# Patient Record
Sex: MALE | Race: BLACK OR AFRICAN AMERICAN | NOT HISPANIC OR LATINO | Employment: FULL TIME | URBAN - METROPOLITAN AREA
[De-identification: names, ages, dates, MRNs, and addresses within clinical notes are randomized per-mention and may not be internally consistent; named-entity substitution may affect disease eponyms.]

---

## 2022-03-24 ENCOUNTER — HOSPITAL ENCOUNTER (INPATIENT)
Facility: HOSPITAL | Age: 62
LOS: 3 days | Discharge: HOME | DRG: 280 | End: 2022-03-27
Attending: EMERGENCY MEDICINE | Admitting: INTERNAL MEDICINE
Payer: COMMERCIAL

## 2022-03-24 DIAGNOSIS — I21.4 NSTEMI (NON-ST ELEVATED MYOCARDIAL INFARCTION): ICD-10-CM

## 2022-03-24 DIAGNOSIS — R79.89 ELEVATED TROPONIN: ICD-10-CM

## 2022-03-24 DIAGNOSIS — I50.9 ACUTE CONGESTIVE HEART FAILURE, UNSPECIFIED HEART FAILURE TYPE: ICD-10-CM

## 2022-03-24 DIAGNOSIS — R06.02 SHORTNESS OF BREATH: ICD-10-CM

## 2022-03-24 DIAGNOSIS — I50.9 CHF (CONGESTIVE HEART FAILURE): ICD-10-CM

## 2022-03-24 DIAGNOSIS — I16.1 HYPERTENSIVE EMERGENCY: Primary | ICD-10-CM

## 2022-03-24 PROBLEM — I16.0 HYPERTENSIVE URGENCY: Status: ACTIVE | Noted: 2022-03-24

## 2022-03-24 LAB
ALBUMIN SERPL BCP-MCNC: 3.9 G/DL (ref 3.5–5.2)
ALP SERPL-CCNC: 107 U/L (ref 55–135)
ALT SERPL W/O P-5'-P-CCNC: 48 U/L (ref 10–44)
ANION GAP SERPL CALC-SCNC: 16 MMOL/L (ref 8–16)
AST SERPL-CCNC: 65 U/L (ref 10–40)
BASOPHILS # BLD AUTO: 0.04 K/UL (ref 0–0.2)
BASOPHILS NFR BLD: 0.9 % (ref 0–1.9)
BILIRUB SERPL-MCNC: 0.4 MG/DL (ref 0.1–1)
BNP SERPL-MCNC: 2884 PG/ML (ref 0–99)
BUN SERPL-MCNC: 19 MG/DL (ref 8–23)
CALCIUM SERPL-MCNC: 9.7 MG/DL (ref 8.7–10.5)
CHLORIDE SERPL-SCNC: 102 MMOL/L (ref 95–110)
CO2 SERPL-SCNC: 25 MMOL/L (ref 23–29)
CREAT SERPL-MCNC: 1.4 MG/DL (ref 0.5–1.4)
DIFFERENTIAL METHOD: ABNORMAL
EOSINOPHIL # BLD AUTO: 0.2 K/UL (ref 0–0.5)
EOSINOPHIL NFR BLD: 4 % (ref 0–8)
ERYTHROCYTE [DISTWIDTH] IN BLOOD BY AUTOMATED COUNT: 15.8 % (ref 11.5–14.5)
EST. GFR  (AFRICAN AMERICAN): >60 ML/MIN/1.73 M^2
EST. GFR  (NON AFRICAN AMERICAN): 54 ML/MIN/1.73 M^2
GLUCOSE SERPL-MCNC: 79 MG/DL (ref 70–110)
HCT VFR BLD AUTO: 50.8 % (ref 40–54)
HCV AB SERPL QL IA: NEGATIVE
HEP C VIRUS HOLD SPECIMEN: NORMAL
HGB BLD-MCNC: 16.7 G/DL (ref 14–18)
HIV 1+2 AB+HIV1 P24 AG SERPL QL IA: NEGATIVE
IMM GRANULOCYTES # BLD AUTO: 0.01 K/UL (ref 0–0.04)
IMM GRANULOCYTES NFR BLD AUTO: 0.2 % (ref 0–0.5)
LYMPHOCYTES # BLD AUTO: 1.2 K/UL (ref 1–4.8)
LYMPHOCYTES NFR BLD: 26.7 % (ref 18–48)
MCH RBC QN AUTO: 29.5 PG (ref 27–31)
MCHC RBC AUTO-ENTMCNC: 32.9 G/DL (ref 32–36)
MCV RBC AUTO: 90 FL (ref 82–98)
MONOCYTES # BLD AUTO: 0.2 K/UL (ref 0.3–1)
MONOCYTES NFR BLD: 5.3 % (ref 4–15)
NEUTROPHILS # BLD AUTO: 2.9 K/UL (ref 1.8–7.7)
NEUTROPHILS NFR BLD: 62.9 % (ref 38–73)
NRBC BLD-RTO: 0 /100 WBC
PLATELET # BLD AUTO: 158 K/UL (ref 150–450)
PMV BLD AUTO: 10.2 FL (ref 9.2–12.9)
POTASSIUM SERPL-SCNC: 4.7 MMOL/L (ref 3.5–5.1)
PROT SERPL-MCNC: 8 G/DL (ref 6–8.4)
RBC # BLD AUTO: 5.67 M/UL (ref 4.6–6.2)
SARS-COV-2 RDRP RESP QL NAA+PROBE: NEGATIVE
SODIUM SERPL-SCNC: 143 MMOL/L (ref 136–145)
TROPONIN I SERPL DL<=0.01 NG/ML-MCNC: 0.14 NG/ML (ref 0–0.03)
TROPONIN I SERPL DL<=0.01 NG/ML-MCNC: 0.14 NG/ML (ref 0–0.03)
WBC # BLD AUTO: 4.54 K/UL (ref 3.9–12.7)

## 2022-03-24 PROCEDURE — 20000000 HC ICU ROOM

## 2022-03-24 PROCEDURE — 80061 LIPID PANEL: CPT | Performed by: NURSE PRACTITIONER

## 2022-03-24 PROCEDURE — 63600175 PHARM REV CODE 636 W HCPCS: Performed by: EMERGENCY MEDICINE

## 2022-03-24 PROCEDURE — 85025 COMPLETE CBC W/AUTO DIFF WBC: CPT | Performed by: REGISTERED NURSE

## 2022-03-24 PROCEDURE — 80053 COMPREHEN METABOLIC PANEL: CPT | Performed by: REGISTERED NURSE

## 2022-03-24 PROCEDURE — 99291 CRITICAL CARE FIRST HOUR: CPT | Mod: 25

## 2022-03-24 PROCEDURE — 96374 THER/PROPH/DIAG INJ IV PUSH: CPT

## 2022-03-24 PROCEDURE — 84484 ASSAY OF TROPONIN QUANT: CPT | Performed by: REGISTERED NURSE

## 2022-03-24 PROCEDURE — 87389 HIV-1 AG W/HIV-1&-2 AB AG IA: CPT | Performed by: EMERGENCY MEDICINE

## 2022-03-24 PROCEDURE — 25000003 PHARM REV CODE 250: Performed by: EMERGENCY MEDICINE

## 2022-03-24 PROCEDURE — 25500020 PHARM REV CODE 255: Performed by: EMERGENCY MEDICINE

## 2022-03-24 PROCEDURE — 36415 COLL VENOUS BLD VENIPUNCTURE: CPT | Performed by: NURSE PRACTITIONER

## 2022-03-24 PROCEDURE — 83880 ASSAY OF NATRIURETIC PEPTIDE: CPT | Performed by: REGISTERED NURSE

## 2022-03-24 PROCEDURE — 93005 ELECTROCARDIOGRAM TRACING: CPT

## 2022-03-24 PROCEDURE — 93010 EKG 12-LEAD: ICD-10-PCS | Mod: ,,, | Performed by: STUDENT IN AN ORGANIZED HEALTH CARE EDUCATION/TRAINING PROGRAM

## 2022-03-24 PROCEDURE — 84484 ASSAY OF TROPONIN QUANT: CPT | Mod: 91 | Performed by: NURSE PRACTITIONER

## 2022-03-24 PROCEDURE — 93010 ELECTROCARDIOGRAM REPORT: CPT | Mod: ,,, | Performed by: STUDENT IN AN ORGANIZED HEALTH CARE EDUCATION/TRAINING PROGRAM

## 2022-03-24 PROCEDURE — U0002 COVID-19 LAB TEST NON-CDC: HCPCS | Performed by: EMERGENCY MEDICINE

## 2022-03-24 PROCEDURE — 86803 HEPATITIS C AB TEST: CPT | Performed by: EMERGENCY MEDICINE

## 2022-03-24 RX ORDER — ACETAMINOPHEN 325 MG/1
650 TABLET ORAL EVERY 4 HOURS PRN
Status: DISCONTINUED | OUTPATIENT
Start: 2022-03-24 | End: 2022-03-27 | Stop reason: HOSPADM

## 2022-03-24 RX ORDER — AMOXICILLIN 250 MG
1 CAPSULE ORAL DAILY PRN
Status: DISCONTINUED | OUTPATIENT
Start: 2022-03-24 | End: 2022-03-27 | Stop reason: HOSPADM

## 2022-03-24 RX ORDER — METOPROLOL TARTRATE 25 MG/1
25 TABLET, FILM COATED ORAL 2 TIMES DAILY
Status: DISCONTINUED | OUTPATIENT
Start: 2022-03-25 | End: 2022-03-27

## 2022-03-24 RX ORDER — AMLODIPINE BESYLATE 5 MG/1
10 TABLET ORAL
Status: COMPLETED | OUTPATIENT
Start: 2022-03-24 | End: 2022-03-24

## 2022-03-24 RX ORDER — SODIUM CHLORIDE 0.9 % (FLUSH) 0.9 %
10 SYRINGE (ML) INJECTION EVERY 12 HOURS PRN
Status: DISCONTINUED | OUTPATIENT
Start: 2022-03-24 | End: 2022-03-27 | Stop reason: HOSPADM

## 2022-03-24 RX ORDER — ATORVASTATIN CALCIUM 40 MG/1
40 TABLET, FILM COATED ORAL DAILY
Status: DISCONTINUED | OUTPATIENT
Start: 2022-03-25 | End: 2022-03-27 | Stop reason: HOSPADM

## 2022-03-24 RX ORDER — GLUCAGON 1 MG
1 KIT INJECTION
Status: DISCONTINUED | OUTPATIENT
Start: 2022-03-24 | End: 2022-03-27 | Stop reason: HOSPADM

## 2022-03-24 RX ORDER — SIMETHICONE 80 MG
1 TABLET,CHEWABLE ORAL 4 TIMES DAILY PRN
Status: DISCONTINUED | OUTPATIENT
Start: 2022-03-24 | End: 2022-03-27 | Stop reason: HOSPADM

## 2022-03-24 RX ORDER — TALC
6 POWDER (GRAM) TOPICAL NIGHTLY PRN
Status: DISCONTINUED | OUTPATIENT
Start: 2022-03-24 | End: 2022-03-27 | Stop reason: HOSPADM

## 2022-03-24 RX ORDER — FUROSEMIDE 10 MG/ML
40 INJECTION INTRAMUSCULAR; INTRAVENOUS
Status: DISCONTINUED | OUTPATIENT
Start: 2022-03-25 | End: 2022-03-25

## 2022-03-24 RX ORDER — MORPHINE SULFATE 4 MG/ML
6 INJECTION, SOLUTION INTRAMUSCULAR; INTRAVENOUS
Status: COMPLETED | OUTPATIENT
Start: 2022-03-24 | End: 2022-03-24

## 2022-03-24 RX ORDER — NALOXONE HCL 0.4 MG/ML
0.02 VIAL (ML) INJECTION
Status: DISCONTINUED | OUTPATIENT
Start: 2022-03-24 | End: 2022-03-27 | Stop reason: HOSPADM

## 2022-03-24 RX ORDER — NICARDIPINE HYDROCHLORIDE 0.2 MG/ML
0-15 INJECTION INTRAVENOUS CONTINUOUS
Status: DISCONTINUED | OUTPATIENT
Start: 2022-03-24 | End: 2022-03-25

## 2022-03-24 RX ORDER — IBUPROFEN 200 MG
24 TABLET ORAL
Status: DISCONTINUED | OUTPATIENT
Start: 2022-03-24 | End: 2022-03-27 | Stop reason: HOSPADM

## 2022-03-24 RX ORDER — FUROSEMIDE 40 MG/1
40 TABLET ORAL
Status: COMPLETED | OUTPATIENT
Start: 2022-03-24 | End: 2022-03-24

## 2022-03-24 RX ORDER — ASPIRIN 81 MG/1
81 TABLET ORAL DAILY
Status: DISCONTINUED | OUTPATIENT
Start: 2022-03-25 | End: 2022-03-27 | Stop reason: HOSPADM

## 2022-03-24 RX ORDER — ENOXAPARIN SODIUM 100 MG/ML
40 INJECTION SUBCUTANEOUS EVERY 24 HOURS
Status: DISCONTINUED | OUTPATIENT
Start: 2022-03-25 | End: 2022-03-27 | Stop reason: HOSPADM

## 2022-03-24 RX ORDER — ASPIRIN 325 MG
325 TABLET ORAL
Status: COMPLETED | OUTPATIENT
Start: 2022-03-24 | End: 2022-03-24

## 2022-03-24 RX ORDER — ONDANSETRON 2 MG/ML
4 INJECTION INTRAMUSCULAR; INTRAVENOUS EVERY 8 HOURS PRN
Status: DISCONTINUED | OUTPATIENT
Start: 2022-03-24 | End: 2022-03-27 | Stop reason: HOSPADM

## 2022-03-24 RX ORDER — IBUPROFEN 200 MG
16 TABLET ORAL
Status: DISCONTINUED | OUTPATIENT
Start: 2022-03-24 | End: 2022-03-27 | Stop reason: HOSPADM

## 2022-03-24 RX ADMIN — MORPHINE SULFATE 6 MG: 4 INJECTION INTRAVENOUS at 05:03

## 2022-03-24 RX ADMIN — IOHEXOL 100 ML: 350 INJECTION, SOLUTION INTRAVENOUS at 06:03

## 2022-03-24 RX ADMIN — NICARDIPINE HYDROCHLORIDE 5 MG/HR: 0.2 INJECTION, SOLUTION INTRAVENOUS at 08:03

## 2022-03-24 RX ADMIN — FUROSEMIDE 40 MG: 40 INJECTION, SOLUTION INTRAMUSCULAR; INTRAVENOUS at 11:03

## 2022-03-24 RX ADMIN — ASPIRIN 325 MG ORAL TABLET 325 MG: 325 PILL ORAL at 08:03

## 2022-03-24 RX ADMIN — AMLODIPINE BESYLATE 10 MG: 5 TABLET ORAL at 04:03

## 2022-03-24 RX ADMIN — FUROSEMIDE 40 MG: 40 TABLET ORAL at 08:03

## 2022-03-24 NOTE — FIRST PROVIDER EVALUATION
Medical screening exam completed.  I have conducted a focused provider triage encounter, findings are as follows:    Brief history of present illness:  61-year-old male presents emergency department with abdominal pain that is consistent with his previous diverticulitis attacks.  Patient reports the pain is so bad that he sometimes feels short of breath.  Sent from [facility] for further evaluation.    There were no vitals filed for this visit.    Pertinent physical exam:  No acute distress    Brief workup plan:  Labs and further evaluation    Preliminary workup initiated; this workup will be continued and followed by the physician or advanced practice provider that is assigned to the patient when roomed.

## 2022-03-24 NOTE — ED PROVIDER NOTES
SCRIBE #1 NOTE: I, Jose Guadalupe Priest, am scribing for, and in the presence of, Valente Goode MD. I have scribed the entire note.       History     Chief Complaint   Patient presents with    Abdominal Pain     Pt presented to ED with c/o abdominal pains for the past week pt has hx of diverticulitis, Pt also having SOB upon exertion for the past 3 days       Review of patient's allergies indicates:  No Known Allergies      History of Present Illness     HPI    3/24/2022, 4:43 PM  History obtained from the patient      History of Present Illness: Vipin Person Jr. is a 61 y.o. male patient with a PMHx of HTN who presents to the Emergency Department for evaluation of periumbilical abdominal pain which onset 1 week ago. Symptoms are constant and moderate in severity. Patient reports that he has been out of his HTN medication for a long, unspecified period of time. Patient reports that he was previously diagnosed with diverticulitis. Associated sxs include SOB on exertion. Patient denies any nausea, vomiting, chest pain, chills, diarrhea, constipation, and all other sxs at this time. No prior Tx reported. No further complaints or concerns at this time.       Arrival mode: Personal vehicle    PCP: No primary care provider on file.        Past Medical History:  No past medical history on file.    Past Surgical History:  No past surgical history on file.      Family History:  No family history on file.    Social History:  Social History     Tobacco Use    Smoking status: Not on file    Smokeless tobacco: Not on file   Substance and Sexual Activity    Alcohol use: Not on file    Drug use: Not on file    Sexual activity: Not on file        Review of Systems     Review of Systems   Constitutional: Negative for chills and fever.   HENT: Negative for sore throat.    Respiratory: Positive for shortness of breath.    Cardiovascular: Negative for chest pain.   Gastrointestinal: Positive for abdominal pain (Periumbilical). Negative  for constipation, diarrhea, nausea and vomiting.   Genitourinary: Negative for dysuria.   Musculoskeletal: Negative for back pain.   Skin: Negative for rash.   Neurological: Negative for weakness.   Hematological: Does not bruise/bleed easily.   All other systems reviewed and are negative.       Physical Exam     Initial Vitals [03/24/22 1611]   BP Pulse Resp Temp SpO2   (!) 180/130 105 20 98 °F (36.7 °C) 95 %      MAP       --          Physical Exam  Nursing Notes and Vital Signs Reviewed.  Constitutional: Patient is in no acute distress. Well-developed and well-nourished.  Head: Atraumatic. Normocephalic.  Eyes: PERRL. EOM intact. Conjunctivae are not pale. No scleral icterus.  ENT: Mucous membranes are moist. Oropharynx is clear and symmetric.    Neck: Supple. Full ROM. No lymphadenopathy.  Cardiovascular: Tachycardic. Regular rhythm. No murmurs, rubs, or gallops. Distal pulses are 2+ and symmetric.  Pulmonary/Chest: No respiratory distress. Clear to auscultation bilaterally. No wheezing or rales.  Abdominal: Soft and non-distended.  There is no tenderness.  No rebound, guarding, or rigidity. Good bowel sounds.  Genitourinary: No CVA tenderness  Musculoskeletal: Moves all extremities. No obvious deformities. No edema. No calf tenderness.  Skin: Warm and dry.  Neurological:  Alert, awake, and appropriate.  Normal speech.  No acute focal neurological deficits are appreciated.  Psychiatric: Normal affect. Good eye contact. Appropriate in content.     ED Course   Critical Care    Date/Time: 3/25/2022 5:35 AM  Performed by: Valente Goode MD  Authorized by: Gibson Packer MD   Direct patient critical care time: 8 minutes  Additional history critical care time: 7 minutes  Ordering / reviewing critical care time: 6 minutes  Documentation critical care time: 7 minutes  Consulting other physicians critical care time: 6 minutes  Consult with family critical care time: 5 minutes  Other critical care time: 4  "minutes  Total critical care time (exclusive of procedural time) : 43 minutes  Critical care time was exclusive of separately billable procedures and treating other patients and teaching time.  Critical care was necessary to treat or prevent imminent or life-threatening deterioration of the following conditions: cardiac failure.  Critical care was time spent personally by me on the following activities: blood draw for specimens, development of treatment plan with patient or surrogate, discussions with consultants, interpretation of cardiac output measurements, evaluation of patient's response to treatment, examination of patient, obtaining history from patient or surrogate, ordering and review of laboratory studies, ordering and review of radiographic studies, ordering and performing treatments and interventions, pulse oximetry, re-evaluation of patient's condition and review of old charts.        ED Vital Signs:  Vitals:    03/24/22 1611 03/24/22 1653 03/24/22 1741 03/24/22 1749   BP: (!) 180/130 (!) 175/133 (!) 171/122    Pulse: 105      Resp: 20   20   Temp: 98 °F (36.7 °C)      TempSrc: Oral      SpO2: 95%      Weight: 62.7 kg (138 lb 3.7 oz)      Height: 5' 4" (1.626 m)       03/24/22 2000 03/24/22 2004   BP: (!) 166/117    Pulse:  95   Resp:     Temp:     TempSrc:     SpO2:     Weight:     Height:         Abnormal Lab Results:  Labs Reviewed   CBC W/ AUTO DIFFERENTIAL - Abnormal; Notable for the following components:       Result Value    RDW 15.8 (*)     Mono # 0.2 (*)     All other components within normal limits    Narrative:     Release to patient->Immediate   COMPREHENSIVE METABOLIC PANEL - Abnormal; Notable for the following components:    AST 65 (*)     ALT 48 (*)     eGFR if non  54 (*)     All other components within normal limits    Narrative:     Release to patient->Immediate   B-TYPE NATRIURETIC PEPTIDE - Abnormal; Notable for the following components:    BNP 2,884 (*)     All other " components within normal limits    Narrative:     Release to patient->Immediate   TROPONIN I - Abnormal; Notable for the following components:    Troponin I 0.144 (*)     All other components within normal limits    Narrative:     Release to patient->Immediate   HIV 1 / 2 ANTIBODY    Narrative:     Release to patient->Immediate   HEPATITIS C ANTIBODY    Narrative:     Release to patient->Immediate   HEP C VIRUS HOLD SPECIMEN    Narrative:     Release to patient->Immediate   SARS-COV-2 RNA AMPLIFICATION, QUAL        All Lab Results:  Results for orders placed or performed during the hospital encounter of 03/24/22   CBC auto differential   Result Value Ref Range    WBC 4.54 3.90 - 12.70 K/uL    RBC 5.67 4.60 - 6.20 M/uL    Hemoglobin 16.7 14.0 - 18.0 g/dL    Hematocrit 50.8 40.0 - 54.0 %    MCV 90 82 - 98 fL    MCH 29.5 27.0 - 31.0 pg    MCHC 32.9 32.0 - 36.0 g/dL    RDW 15.8 (H) 11.5 - 14.5 %    Platelets 158 150 - 450 K/uL    MPV 10.2 9.2 - 12.9 fL    Immature Granulocytes 0.2 0.0 - 0.5 %    Gran # (ANC) 2.9 1.8 - 7.7 K/uL    Immature Grans (Abs) 0.01 0.00 - 0.04 K/uL    Lymph # 1.2 1.0 - 4.8 K/uL    Mono # 0.2 (L) 0.3 - 1.0 K/uL    Eos # 0.2 0.0 - 0.5 K/uL    Baso # 0.04 0.00 - 0.20 K/uL    nRBC 0 0 /100 WBC    Gran % 62.9 38.0 - 73.0 %    Lymph % 26.7 18.0 - 48.0 %    Mono % 5.3 4.0 - 15.0 %    Eosinophil % 4.0 0.0 - 8.0 %    Basophil % 0.9 0.0 - 1.9 %    Differential Method Automated    Comprehensive metabolic panel   Result Value Ref Range    Sodium 143 136 - 145 mmol/L    Potassium 4.7 3.5 - 5.1 mmol/L    Chloride 102 95 - 110 mmol/L    CO2 25 23 - 29 mmol/L    Glucose 79 70 - 110 mg/dL    BUN 19 8 - 23 mg/dL    Creatinine 1.4 0.5 - 1.4 mg/dL    Calcium 9.7 8.7 - 10.5 mg/dL    Total Protein 8.0 6.0 - 8.4 g/dL    Albumin 3.9 3.5 - 5.2 g/dL    Total Bilirubin 0.4 0.1 - 1.0 mg/dL    Alkaline Phosphatase 107 55 - 135 U/L    AST 65 (H) 10 - 40 U/L    ALT 48 (H) 10 - 44 U/L    Anion Gap 16 8 - 16 mmol/L    eGFR if  African American >60 >60 mL/min/1.73 m^2    eGFR if non African American 54 (A) >60 mL/min/1.73 m^2   Brain natriuretic peptide   Result Value Ref Range    BNP 2,884 (H) 0 - 99 pg/mL   Troponin I   Result Value Ref Range    Troponin I 0.144 (H) 0.000 - 0.026 ng/mL   HIV 1/2 Ag/Ab (4th Gen)   Result Value Ref Range    HIV 1/2 Ag/Ab Negative Negative   Hepatitis C Antibody   Result Value Ref Range    Hepatitis C Ab Negative Negative   HCV Virus Hold Specimen   Result Value Ref Range    HEP C Virus Hold Specimen Hold for HCV sendout          Imaging Results:  Imaging Results          CT Abdomen Pelvis With Contrast (Final result)  Result time 03/24/22 18:38:25    Final result by Carl Newby MD (03/24/22 18:38:25)                 Impression:      Trace bilateral pleural effusions and probable pulmonary edema.  Correlation for volume overload.    Few scattered subsolid opacities possibly related to edema but viral pneumonitis not excluded.    No evidence of diverticulitis or other acute abdominal abnormality.    All CT scans at this facility are performed  using dose modulation techniques as appropriate to performed exam including the following:  automated exposure control; adjustment of mA and/or kV according to the patients size (this includes techniques or standardized protocols for targeted exams where dose is matched to indication/reason for exam: i.e. extremities or head);  iterative reconstruction technique.      Electronically signed by: Carl Newby  Date:    03/24/2022  Time:    18:38             Narrative:    EXAMINATION:  CT ABDOMEN PELVIS WITH CONTRAST    CLINICAL HISTORY:  LLQ abdominal pain;    TECHNIQUE:  Low dose axial images, sagittal and coronal reformations were obtained from the lung bases to the pubic symphysis.  Contrast was administered.  Images acquired after the administration 100 mL Omnipaque 350 IV contrast.    COMPARISON:  None    FINDINGS:  Heart: Normal in size. No pericardial  effusion.    Lung Bases: Trace bilateral pleural effusions.  Probable mild pulmonary edema.  Few scattered subsolid opacities possibility to edema or viral pneumonitis.  Correlation is advised.    Liver: Normal in size and attenuation, with no focal hepatic lesions.    Gallbladder: No calcified gallstones.    Bile Ducts: No evidence of dilated ducts.    Pancreas: No mass or peripancreatic fat stranding.    Spleen: Unremarkable.    Adrenals: Unremarkable.    Kidneys/ Ureters: No obstructive uropathy.  No hydronephrosis.  Symmetric enhancement.  No nonobstructive nephrolithiasis.    Bladder: No evidence of wall thickening.    Reproductive organs: Mild prostatomegaly.    GI Tract/Mesentery: No evidence of bowel obstruction or inflammation.  Diverticulosis without definite evidence of diverticulitis.  The appendix is nonvisualized.  No secondary signs of appendicitis.    Peritoneal Space: No ascites. No free air.    Retroperitoneum: No significant adenopathy.    Abdominal wall: Unremarkable.    Vasculature: Mild aortoiliac atherosclerosis.  No aneurysm.    Bones: No acute fracture.  Multifocal osseous degenerative changes.                               X-Ray Chest 1 View (Final result)  Result time 03/24/22 17:07:37    Final result by Carl Newby MD (03/24/22 17:07:37)                 Impression:      No acute abnormality.      Electronically signed by: Carl Newby  Date:    03/24/2022  Time:    17:07             Narrative:    EXAMINATION:  XR CHEST 1 VIEW    CLINICAL HISTORY:  Shortness of breath    TECHNIQUE:  Single frontal view of the chest was performed.    COMPARISON:  None    FINDINGS:  The lungs are clear, with normal appearance of pulmonary vasculature and no pleural effusion or pneumothorax.    The cardiac silhouette is borderline enlarged.  The hilar and mediastinal contours are unremarkable.    Bones are intact.                                 The EKG was ordered, reviewed, and independently  interpreted by the ED provider.  Interpretation time: 16:13  Rate: 104 BPM  Rhythm: sinus tachycardia  Interpretation: Moderate voltage criteria for LVH, may be normal variant. Septal infarct, age undetermined. No STEMI.             The Emergency Provider reviewed the vital signs and test results, which are outlined above.     ED Discussion       7:07 PM: Discussed case with Guerita Gonzalez NP, ([facility]). Dr. Castle agrees with current care and management of pt and accepts admission.   Admitting Service: Hospital medicine  Admitting Physician: Dr. Castle  Admit to: ICU    7:08 PM: Re-evaluated pt. I have discussed test results, shared treatment plan, and the need for admission with patient and family at bedside. Pt and family express understanding at this time and agree with all information. All questions answered. Pt and family have no further questions or concerns at this time. Pt is ready for admit.           Medical Decision Making:   Clinical Tests:   Lab Tests: Ordered and Reviewed  Radiological Study: Ordered and Reviewed  Medical Tests: Ordered and Reviewed           ED Medication(s):  Medications   niCARdipine 40 mg/200 mL (0.2 mg/mL) infusion (5 mg/hr Intravenous New Bag 3/24/22 2007)   amLODIPine tablet 10 mg (10 mg Oral Given 3/24/22 1653)   morphine injection 6 mg (6 mg Intravenous Given 3/24/22 1749)   aspirin tablet 325 mg (325 mg Oral Given 3/24/22 2000)   iohexoL (OMNIPAQUE 350) injection 100 mL (100 mLs Intravenous Given 3/24/22 1810)   furosemide tablet 40 mg (40 mg Oral Given 3/24/22 2000)       New Prescriptions    No medications on file               Scribe Attestation:   Scribe #1: I performed the above scribed service and the documentation accurately describes the services I performed. I attest to the accuracy of the note.     Attending:   Physician Attestation Statement for Scribe #1: I, Valente Goode MD, personally performed the services described in this documentation, as  scribed by Jose Guadalupe Priest, in my presence, and it is both accurate and complete.           Clinical Impression       ICD-10-CM ICD-9-CM   1. Hypertensive emergency  I16.1 401.9   2. Shortness of breath  R06.02 786.05   3. Acute congestive heart failure, unspecified heart failure type  I50.9 428.0   4. Elevated troponin  R77.8 790.6       Disposition:   Disposition: Admitted  Condition: Serious         Valente Goode MD  03/25/22 0534       Valente Goode MD  03/25/22 0535

## 2022-03-24 NOTE — ED NOTES
"Pt presents to ED for abdominal pain x 1 week pmhx consists of diverticulitis, and HTN. Pt states that he has been eating pistachios and snickers bars knowingly. Pt reports  Shortness of breath at rest which worsens upon exertion O2 saturation currently 95 on RA. Pt is AAOX4, skin is W/D/I, resp e/u, tongue and trachea are midline pt complaining of abdominal pain all over no guarding upon palpation, abdomen is soft round and non distended. pt states that he has not taken his blood pressure medication for a "long time" time frame unknown. [facility] for further plan of care medication administration and assessment.   "

## 2022-03-25 PROBLEM — E11.9 TYPE 2 DIABETES MELLITUS WITHOUT COMPLICATION, WITHOUT LONG-TERM CURRENT USE OF INSULIN: Chronic | Status: ACTIVE | Noted: 2022-03-25

## 2022-03-25 PROBLEM — N28.9 RENAL INSUFFICIENCY: Status: ACTIVE | Noted: 2022-03-25

## 2022-03-25 PROBLEM — I16.1 HYPERTENSIVE EMERGENCY: Status: ACTIVE | Noted: 2022-03-24

## 2022-03-25 PROBLEM — R10.33 PERIUMBILICAL ABDOMINAL PAIN: Status: ACTIVE | Noted: 2022-03-25

## 2022-03-25 LAB
ALBUMIN SERPL BCP-MCNC: 3.8 G/DL (ref 3.5–5.2)
ALP SERPL-CCNC: 108 U/L (ref 55–135)
ALT SERPL W/O P-5'-P-CCNC: 43 U/L (ref 10–44)
ANION GAP SERPL CALC-SCNC: 16 MMOL/L (ref 8–16)
AORTIC ROOT ANNULUS: 2.85 CM
ASCENDING AORTA: 3.31 CM
AST SERPL-CCNC: 44 U/L (ref 10–40)
AV INDEX (PROSTH): 0.65
AV MEAN GRADIENT: 2 MMHG
AV PEAK GRADIENT: 4 MMHG
AV VALVE AREA: 2.55 CM2
AV VELOCITY RATIO: 0.63
BASOPHILS # BLD AUTO: 0.08 K/UL (ref 0–0.2)
BASOPHILS NFR BLD: 1.4 % (ref 0–1.9)
BILIRUB SERPL-MCNC: 0.4 MG/DL (ref 0.1–1)
BSA FOR ECHO PROCEDURE: 1.63 M2
BUN SERPL-MCNC: 18 MG/DL (ref 8–23)
CALCIUM SERPL-MCNC: 10.2 MG/DL (ref 8.7–10.5)
CHLORIDE SERPL-SCNC: 96 MMOL/L (ref 95–110)
CHOLEST SERPL-MCNC: 232 MG/DL (ref 120–199)
CHOLEST/HDLC SERPL: 3 {RATIO} (ref 2–5)
CO2 SERPL-SCNC: 31 MMOL/L (ref 23–29)
CREAT SERPL-MCNC: 1.3 MG/DL (ref 0.5–1.4)
CV ECHO LV RWT: 0.84 CM
DIFFERENTIAL METHOD: ABNORMAL
DOP CALC AO PEAK VEL: 0.95 M/S
DOP CALC AO VTI: 15.3 CM
DOP CALC LVOT AREA: 3.9 CM2
DOP CALC LVOT DIAMETER: 2.24 CM
DOP CALC LVOT PEAK VEL: 0.6 M/S
DOP CALC LVOT STROKE VOLUME: 38.99 CM3
DOP CALC RVOT PEAK VEL: 0.62 M/S
DOP CALC RVOT VTI: 9.8 CM
DOP CALCLVOT PEAK VEL VTI: 9.9 CM
E WAVE DECELERATION TIME: 192.81 MSEC
E/A RATIO: 1.08
E/E' RATIO: 18.57 M/S
ECHO EF ESTIMATED: 24 %
ECHO LV POSTERIOR WALL: 1.77 CM (ref 0.6–1.1)
EJECTION FRACTION: 30 %
EOSINOPHIL # BLD AUTO: 0.3 K/UL (ref 0–0.5)
EOSINOPHIL NFR BLD: 5.5 % (ref 0–8)
ERYTHROCYTE [DISTWIDTH] IN BLOOD BY AUTOMATED COUNT: 15.3 % (ref 11.5–14.5)
EST. GFR  (AFRICAN AMERICAN): >60 ML/MIN/1.73 M^2
EST. GFR  (NON AFRICAN AMERICAN): 59 ML/MIN/1.73 M^2
FRACTIONAL SHORTENING: 11 % (ref 28–44)
GLUCOSE SERPL-MCNC: 95 MG/DL (ref 70–110)
HCT VFR BLD AUTO: 39.5 % (ref 40–54)
HDLC SERPL-MCNC: 77 MG/DL (ref 40–75)
HDLC SERPL: 33.2 % (ref 20–50)
HGB BLD-MCNC: 13.1 G/DL (ref 14–18)
IMM GRANULOCYTES # BLD AUTO: 0.01 K/UL (ref 0–0.04)
IMM GRANULOCYTES NFR BLD AUTO: 0.2 % (ref 0–0.5)
INTERVENTRICULAR SEPTUM: 1.69 CM (ref 0.6–1.1)
IVC DIAMETER: 1.17 CM
LA MAJOR: 4.52 CM
LA MINOR: 3.17 CM
LA WIDTH: 3.17 CM
LDLC SERPL CALC-MCNC: 142.4 MG/DL (ref 63–159)
LEFT ATRIUM SIZE: 2.52 CM
LEFT ATRIUM VOLUME INDEX: 15.6 ML/M2
LEFT ATRIUM VOLUME: 25.3 CM3
LEFT INTERNAL DIMENSION IN SYSTOLE: 3.78 CM (ref 2.1–4)
LEFT VENTRICLE DIASTOLIC VOLUME INDEX: 49.46 ML/M2
LEFT VENTRICLE DIASTOLIC VOLUME: 80.12 ML
LEFT VENTRICLE MASS INDEX: 195 G/M2
LEFT VENTRICLE SYSTOLIC VOLUME INDEX: 37.7 ML/M2
LEFT VENTRICLE SYSTOLIC VOLUME: 61.07 ML
LEFT VENTRICULAR INTERNAL DIMENSION IN DIASTOLE: 4.23 CM (ref 3.5–6)
LEFT VENTRICULAR MASS: 315.99 G
LV LATERAL E/E' RATIO: 16.25 M/S
LV SEPTAL E/E' RATIO: 21.67 M/S
LVOT MG: 0.93 MMHG
LVOT MV: 0.47 CM/S
LYMPHOCYTES # BLD AUTO: 1.8 K/UL (ref 1–4.8)
LYMPHOCYTES NFR BLD: 31.4 % (ref 18–48)
MCH RBC QN AUTO: 29.8 PG (ref 27–31)
MCHC RBC AUTO-ENTMCNC: 33.2 G/DL (ref 32–36)
MCV RBC AUTO: 90 FL (ref 82–98)
MONOCYTES # BLD AUTO: 0.5 K/UL (ref 0.3–1)
MONOCYTES NFR BLD: 8.4 % (ref 4–15)
MV PEAK A VEL: 0.6 M/S
MV PEAK E VEL: 0.65 M/S
MV STENOSIS PRESSURE HALF TIME: 55.91 MS
MV VALVE AREA P 1/2 METHOD: 3.93 CM2
NEUTROPHILS # BLD AUTO: 3 K/UL (ref 1.8–7.7)
NEUTROPHILS NFR BLD: 53.1 % (ref 38–73)
NONHDLC SERPL-MCNC: 155 MG/DL
NRBC BLD-RTO: 0 /100 WBC
PISA TR MAX VEL: 2.74 M/S
PLATELET # BLD AUTO: 271 K/UL (ref 150–450)
PMV BLD AUTO: 10.6 FL (ref 9.2–12.9)
POTASSIUM SERPL-SCNC: 3.9 MMOL/L (ref 3.5–5.1)
PROT SERPL-MCNC: 7.5 G/DL (ref 6–8.4)
PV MEAN GRADIENT: 0.9 MMHG
RA MAJOR: 4.68 CM
RA PRESSURE: 3 MMHG
RA WIDTH: 2.85 CM
RBC # BLD AUTO: 4.39 M/UL (ref 4.6–6.2)
SINUS: 3.34 CM
SODIUM SERPL-SCNC: 143 MMOL/L (ref 136–145)
STJ: 2.85 CM
TDI LATERAL: 0.04 M/S
TDI SEPTAL: 0.03 M/S
TDI: 0.04 M/S
TR MAX PG: 30 MMHG
TRICUSPID ANNULAR PLANE SYSTOLIC EXCURSION: 1.32 CM
TRIGL SERPL-MCNC: 63 MG/DL (ref 30–150)
TROPONIN I SERPL DL<=0.01 NG/ML-MCNC: 0.12 NG/ML (ref 0–0.03)
TV REST PULMONARY ARTERY PRESSURE: 33 MMHG
WBC # BLD AUTO: 5.61 K/UL (ref 3.9–12.7)

## 2022-03-25 PROCEDURE — 84484 ASSAY OF TROPONIN QUANT: CPT | Performed by: NURSE PRACTITIONER

## 2022-03-25 PROCEDURE — 63600175 PHARM REV CODE 636 W HCPCS: Performed by: NURSE PRACTITIONER

## 2022-03-25 PROCEDURE — 85025 COMPLETE CBC W/AUTO DIFF WBC: CPT | Performed by: NURSE PRACTITIONER

## 2022-03-25 PROCEDURE — 25000003 PHARM REV CODE 250: Performed by: NURSE PRACTITIONER

## 2022-03-25 PROCEDURE — 99223 PR INITIAL HOSPITAL CARE,LEVL III: ICD-10-PCS | Mod: 25,,, | Performed by: STUDENT IN AN ORGANIZED HEALTH CARE EDUCATION/TRAINING PROGRAM

## 2022-03-25 PROCEDURE — 25000003 PHARM REV CODE 250: Performed by: INTERNAL MEDICINE

## 2022-03-25 PROCEDURE — 21400001 HC TELEMETRY ROOM

## 2022-03-25 PROCEDURE — 99223 1ST HOSP IP/OBS HIGH 75: CPT | Mod: 25,,, | Performed by: STUDENT IN AN ORGANIZED HEALTH CARE EDUCATION/TRAINING PROGRAM

## 2022-03-25 PROCEDURE — 80053 COMPREHEN METABOLIC PANEL: CPT | Performed by: NURSE PRACTITIONER

## 2022-03-25 PROCEDURE — 36415 COLL VENOUS BLD VENIPUNCTURE: CPT | Performed by: NURSE PRACTITIONER

## 2022-03-25 RX ORDER — ISOSORBIDE MONONITRATE 30 MG/1
30 TABLET, EXTENDED RELEASE ORAL DAILY
Status: DISCONTINUED | OUTPATIENT
Start: 2022-03-25 | End: 2022-03-26

## 2022-03-25 RX ORDER — MUPIROCIN 20 MG/G
OINTMENT TOPICAL 2 TIMES DAILY
Status: DISCONTINUED | OUTPATIENT
Start: 2022-03-25 | End: 2022-03-27 | Stop reason: HOSPADM

## 2022-03-25 RX ORDER — HYDRALAZINE HYDROCHLORIDE 10 MG/1
10 TABLET, FILM COATED ORAL EVERY 8 HOURS
Status: DISCONTINUED | OUTPATIENT
Start: 2022-03-25 | End: 2022-03-26

## 2022-03-25 RX ORDER — FUROSEMIDE 40 MG/1
40 TABLET ORAL DAILY
Status: DISCONTINUED | OUTPATIENT
Start: 2022-03-26 | End: 2022-03-26

## 2022-03-25 RX ORDER — REGADENOSON 0.08 MG/ML
0.4 INJECTION, SOLUTION INTRAVENOUS ONCE
Status: COMPLETED | OUTPATIENT
Start: 2022-03-26 | End: 2022-03-26

## 2022-03-25 RX ADMIN — ASPIRIN 81 MG: 81 TABLET, COATED ORAL at 08:03

## 2022-03-25 RX ADMIN — HYDRALAZINE HYDROCHLORIDE 10 MG: 10 TABLET, FILM COATED ORAL at 08:03

## 2022-03-25 RX ADMIN — METOPROLOL TARTRATE 25 MG: 25 TABLET, FILM COATED ORAL at 09:03

## 2022-03-25 RX ADMIN — ACETAMINOPHEN 650 MG: 325 TABLET ORAL at 10:03

## 2022-03-25 RX ADMIN — ENOXAPARIN SODIUM 40 MG: 100 INJECTION SUBCUTANEOUS at 05:03

## 2022-03-25 RX ADMIN — FUROSEMIDE 40 MG: 40 INJECTION, SOLUTION INTRAMUSCULAR; INTRAVENOUS at 11:03

## 2022-03-25 RX ADMIN — HYDRALAZINE HYDROCHLORIDE 10 MG: 10 TABLET, FILM COATED ORAL at 02:03

## 2022-03-25 RX ADMIN — HYDRALAZINE HYDROCHLORIDE 10 MG: 10 TABLET, FILM COATED ORAL at 09:03

## 2022-03-25 RX ADMIN — ISOSORBIDE MONONITRATE 30 MG: 30 TABLET, EXTENDED RELEASE ORAL at 08:03

## 2022-03-25 RX ADMIN — METOPROLOL TARTRATE 25 MG: 25 TABLET, FILM COATED ORAL at 08:03

## 2022-03-25 RX ADMIN — MUPIROCIN: 20 OINTMENT TOPICAL at 08:03

## 2022-03-25 RX ADMIN — ATORVASTATIN CALCIUM 40 MG: 40 TABLET, FILM COATED ORAL at 08:03

## 2022-03-25 RX ADMIN — ACETAMINOPHEN 650 MG: 325 TABLET ORAL at 08:03

## 2022-03-25 NOTE — NURSING
Telephoned report to norm muse on tele.    1500 transferred pt via wheelchair to room 210.  Updated RN regarding cardiology consult and Echo ordered.

## 2022-03-25 NOTE — HOSPITAL COURSE
Admitted to ICU on Cardene gtt for Hypertensive emergency . BP was 180/130 on admit associated with pulmonary edema , elevated BNP and elevated troponin. Pt started on ASA, statin, BB and IV lasix.. Cardiology was consulted . Additionally pt had periumbilical abdominal pain on presentation which has resolved after administration of 6 mg Morphine IV in the ED. CT abd /plevis with no intraabdominal acute process.     3/25- Pt is seen at bedside in ICU. Fully awake , alert, Ox3. Off Cardene gtt. Admits to not taking home BP meds for at least 2 mos. Denies chest pain. SOB has improved and denies further abd pain. Denies constipation. Troponin elevation is flat, peaked at 0.144 down to 0.125. Oral antihypertensives include BB, Hydralazine and Imdur initiated . Creatinine 1.3. Metabolic alkalosis is noted. Diuretic transitioned to oral Furosemide. Await Cardiology input. Downgrade to telemetry. Echo in progress.     3/26- AAOx 3. Reports feeling better overall. No further abd pain, SOB or chest pain. Echo resulted LVEF 30%, LV global hypokinesis , G1DD, mildly reduced RV systolic function. Cardiology consult obtained and suggested MPI stress test today for ischemic workup. ARB/ACEi not initiated due to bumped creatinine 1.7>1.4. Lasix held. Clinically appeared compensated. BNP down to 566>2884. BP control is favorable.     3/27- S/P MPI stress test yesterday . Stress test with anterior apical scar, no evidence of ischemia. Cardiology suggested to continue GDMT for CHF and outpatient follow up in the cardiology clinic. Creatinine improved to 1.5. Losartan 25 mg po daily initiated . Hydralazine /Imdur discontinued . Pt will continue ASA, BB, ARB and statin . Pt is examined and deemed suitable for discharge.

## 2022-03-25 NOTE — ASSESSMENT & PLAN NOTE
-Continue Cardene drip  -Will resume previous home antihypertensives once Cardene weaned off  -IV lasix 40 mg BID  -Consult Cardiology for management   3/25-  -Cardene gtt weaned off  -Oral antihypertensives initiated and assess response   -Downgrade to telemetry

## 2022-03-25 NOTE — SUBJECTIVE & OBJECTIVE
Interval History:  Troponin elevation is flat, peaked at 0.144 down to 0.125. Oral antihypertensives include BB, Hydralazine and Imdur initiated . Creatinine 1.3. Metabolic alkalosis is noted. Diuretic transitioned to oral Furosemide. Await Cardiology input. Downgrade to telemetry. Echo in progress.       Review of Systems   Constitutional:  Negative for activity change, appetite change and fever.   HENT:  Negative for sore throat.    Eyes:  Negative for visual disturbance.   Respiratory:  Negative for cough, chest tightness and shortness of breath.    Cardiovascular:  Negative for chest pain, palpitations and leg swelling.   Gastrointestinal:  Positive for abdominal pain (resolved). Negative for abdominal distention, constipation, diarrhea, nausea and vomiting.   Endocrine: Negative for polyuria.   Genitourinary:  Negative for decreased urine volume, dysuria, flank pain, frequency and hematuria.   Musculoskeletal:  Negative for back pain and gait problem.   Skin:  Negative for rash.   Neurological:  Negative for syncope, speech difficulty, weakness, light-headedness and headaches.   Psychiatric/Behavioral:  Negative for confusion, hallucinations and sleep disturbance.    Objective:     Vital Signs (Most Recent):  Temp: 97.9 °F (36.6 °C) (03/25/22 1110)  Pulse: 71 (03/25/22 1100)  Resp: (!) 34 (03/25/22 1100)  BP: 111/76 (03/25/22 1100)  SpO2: 97 % (03/25/22 1100)   Vital Signs (24h Range):  Temp:  [97.4 °F (36.3 °C)-98 °F (36.7 °C)] 97.9 °F (36.6 °C)  Pulse:  [] 71  Resp:  [19-54] 34  SpO2:  [87 %-100 %] 97 %  BP: (111-180)/() 111/76     Weight: 58.5 kg (129 lb)  Body mass index is 22.14 kg/m².    Intake/Output Summary (Last 24 hours) at 3/25/2022 1249  Last data filed at 3/25/2022 0819  Gross per 24 hour   Intake 55.39 ml   Output 3525 ml   Net -3469.61 ml      Physical Exam  Constitutional:       General: He is not in acute distress.     Appearance: He is well-developed. He is not diaphoretic.   HENT:       Head: Normocephalic and atraumatic.      Mouth/Throat:      Pharynx: No oropharyngeal exudate.   Eyes:      Conjunctiva/sclera: Conjunctivae normal.      Pupils: Pupils are equal, round, and reactive to light.   Neck:      Thyroid: No thyromegaly.      Vascular: No JVD.   Cardiovascular:      Rate and Rhythm: Normal rate and regular rhythm.      Heart sounds: Normal heart sounds. No murmur heard.  Pulmonary:      Effort: Pulmonary effort is normal. No respiratory distress.      Breath sounds: Normal breath sounds. No wheezing or rales.   Chest:      Chest wall: No tenderness.   Abdominal:      General: Bowel sounds are normal. There is no distension.      Palpations: Abdomen is soft.      Tenderness: There is no abdominal tenderness. There is no guarding or rebound.   Musculoskeletal:         General: Normal range of motion.      Cervical back: Normal range of motion and neck supple.   Lymphadenopathy:      Cervical: No cervical adenopathy.   Skin:     General: Skin is warm and dry.      Findings: No rash.   Neurological:      Mental Status: He is alert and oriented to person, place, and time.      Cranial Nerves: No cranial nerve deficit.      Sensory: No sensory deficit.      Deep Tendon Reflexes: Reflexes normal.       Significant Labs: All pertinent labs within the past 24 hours have been reviewed.  BMP:   Recent Labs   Lab 03/25/22  0402   GLU 95      K 3.9   CL 96   CO2 31*   BUN 18   CREATININE 1.3   CALCIUM 10.2     CBC:   Recent Labs   Lab 03/24/22  1715 03/25/22  0402   WBC 4.54 5.61   HGB 16.7 13.1*   HCT 50.8 39.5*    271     CMP:   Recent Labs   Lab 03/24/22  1715 03/25/22  0402    143   K 4.7 3.9    96   CO2 25 31*   GLU 79 95   BUN 19 18   CREATININE 1.4 1.3   CALCIUM 9.7 10.2   PROT 8.0 7.5   ALBUMIN 3.9 3.8   BILITOT 0.4 0.4   ALKPHOS 107 108   AST 65* 44*   ALT 48* 43   ANIONGAP 16 16   EGFRNONAA 54* 59*     Cardiac Markers:   Recent Labs   Lab 03/24/22 1715   BNP  2,884*       Significant Imaging:

## 2022-03-25 NOTE — ASSESSMENT & PLAN NOTE
- Troponin 0.144. CP free on admission.    - ASA, BB, and statin.    - Trend serial cardiac enzymes and EKG  -Will add heparin drip if troponin trends up  - Check FLP.  - Will obtain 2D echo  - Cardiology consult

## 2022-03-25 NOTE — ASSESSMENT & PLAN NOTE
- Troponin 0.144. CP free on admission.    -EKG with LVH by voltage criteria and non specific ST/T wave changes   - ASA, BB, and statin initiated   - Trend serial cardiac enzymes and EKG  -Will add heparin drip if troponin trends up  - Check FLP.  - Will obtain 2D echo  - Cardiology consult  3/25-  -Likely type 2 in the setting of uncontrolled HTN with Hypertensive emergency   -Await further input from Cardiology

## 2022-03-25 NOTE — PLAN OF CARE
Pt admitted to ICU this shift due to hypertensive urgency on Cardene drip. Upon arrival to unit recheck BP within expected range and titrated drip. Drip off at 2200 after arrival. Pt tolerating well. Given lasix and diuresed. Placed 2L NC on while sleeping due to desaturating. Pt tolerating well Expect to wean oxygen this AM. Will continue to monitor pt. VSS.    Problem: Adult Inpatient Plan of Care  Goal: Plan of Care Review  Outcome: Ongoing, Progressing  Goal: Patient-Specific Goal (Individualized)  Outcome: Ongoing, Progressing  Goal: Absence of Hospital-Acquired Illness or Injury  Outcome: Ongoing, Progressing  Goal: Optimal Comfort and Wellbeing  Outcome: Ongoing, Progressing  Goal: Readiness for Transition of Care  Outcome: Ongoing, Progressing

## 2022-03-25 NOTE — ASSESSMENT & PLAN NOTE
- Currently improved and denies further pain  -Tolerating diet   -CT abd/pelvis with no acute intraabdominal process

## 2022-03-25 NOTE — H&P
[facility] Intensive [facility] Medicine  History & Physical    Patient Name: Vipin Person Jr.  MRN: [id number]  Patient Class: IP- Inpatient  Admission Date: 3/24/2022  Attending Physician: Gibson Packer, *   Primary Care Provider: No primary care provider on file.         Patient information was obtained from patient and ER records.     Subjective:     Principal Problem:Hypertensive urgency    Chief Complaint:   Chief Complaint   Patient presents with    Abdominal Pain     Pt presented to ED with c/o abdominal pains for the past week pt has hx of diverticulitis, Pt also having SOB upon exertion for the past 3 days          HPI: Vipin Person Jr. is a 61-year-old male  male with a PMHx of HTN who presents to [facility] ED for evaluation of periumbilical abdominal pain which onset 1 week ago. States since he has a history of diverticulosis, he assumed the pain he was experiencing was related to that and simply planned for a PCP follow-up regarding the possible diverticulosis pain and refill of HTN medications on next Monday. However, two days ago he gradually started experiencing shortness of breath that significantly began worsening today and he decided he could not wait until Monday to see the PCP and came to the ED right away. Reports he has been out of his HTN medication for over two months while he had been away in [place]. Patient denies fever, H/A, nausea, vomiting, chest pain, chills, diarrhea, or constipation. CT of Abdomen/Pelvis which showed trace bilateral pleural effusions and probable pulmonary edema. Correlation for volume overload. Few scattered subsolid opacities possibly related to edema but viral pneumonitis not excluded. No evidence of diverticulitis or other acute abdominal abnormality. EKG showed sinus tachycardia. Labs included BNP 2,844, Troponin 0.144. He is a Full Code status and his surrogate decision maker is his mother, Breonna Person at [phone]. He is  admitted to Hospital Medicine and placed in ICU for close monitoring.       No past medical history on file.    No past surgical history on file.    Review of patient's allergies indicates:  No Known Allergies    No current facility-administered medications on file prior to encounter.     No current outpatient medications on file prior to encounter.     Family History    Reviewed and not pertinent        Tobacco Use    Smoking status: Not on file    Smokeless tobacco: Not on file   Substance and Sexual Activity    Alcohol use: Not on file    Drug use: Not on file    Sexual activity: Not on file     Review of Systems   Constitutional: Negative.    HENT:  Negative for congestion, sinus pressure and sinus pain.    Eyes: Negative.    Respiratory:  Positive for shortness of breath. Negative for cough, chest tightness and wheezing.    Cardiovascular:  Negative for chest pain, palpitations and leg swelling.   Gastrointestinal:  Positive for abdominal pain. Negative for diarrhea, nausea and vomiting.   Endocrine: Negative.    Genitourinary: Negative.    Musculoskeletal: Negative.    Skin: Negative.    Allergic/Immunologic: Negative.    Neurological:  Negative for dizziness, weakness, light-headedness and headaches.   Hematological: Negative.    Psychiatric/Behavioral:  Negative for confusion and decreased concentration.    Objective:     Vital Signs (Most Recent):  Temp: 97.8 °F (36.6 °C) (03/24/22 2130)  Pulse: 105 (03/24/22 2200)  Resp: (!) 54 (03/24/22 2200)  BP: 124/89 (03/24/22 2200)  SpO2: 96 % (03/24/22 2200)   Vital Signs (24h Range):  Temp:  [97.8 °F (36.6 °C)-98 °F (36.7 °C)] 97.8 °F (36.6 °C)  Pulse:  [] 105  Resp:  [20-54] 54  SpO2:  [95 %-97 %] 96 %  BP: (124-180)/() 124/89     Weight: 58.9 kg (129 lb 13.6 oz)  Body mass index is 22.29 kg/m².    Physical Exam  Vitals and nursing note reviewed.   Constitutional:       General: He is awake.      Appearance: Normal appearance. He is  well-developed. He is ill-appearing.   HENT:      Head: Normocephalic and atraumatic.      Nose: Nose normal.      Mouth/Throat:      Mouth: Mucous membranes are moist.   Eyes:      Extraocular Movements: Extraocular movements intact.      Pupils: Pupils are equal, round, and reactive to light.   Cardiovascular:      Rate and Rhythm: Tachycardia present.   Pulmonary:      Effort: Tachypnea present.      Breath sounds: Examination of the right-lower field reveals decreased breath sounds. Examination of the left-lower field reveals decreased breath sounds and wheezing. Decreased breath sounds and wheezing present.   Abdominal:      General: Bowel sounds are normal.      Palpations: Abdomen is soft.      Tenderness: There is no abdominal tenderness.   Musculoskeletal:      Cervical back: Full passive range of motion without pain, normal range of motion and neck supple.      Right lower leg: No edema.      Left lower leg: No edema.   Skin:     General: Skin is warm.      Capillary Refill: Capillary refill takes less than 2 seconds.   Neurological:      Mental Status: He is alert and oriented to person, place, and time.      Cranial Nerves: Cranial nerves are intact.   Psychiatric:         Attention and Perception: Attention normal.         Mood and Affect: Mood normal.         Speech: Speech normal.         Behavior: Behavior normal. Behavior is cooperative.         Cognition and Memory: Cognition normal.         Judgment: Judgment normal.         CRANIAL NERVES     CN III, IV, VI   Pupils are equal, round, and reactive to light.     Significant Labs:   Results for orders placed or performed during the hospital encounter of 03/24/22   CBC auto differential   Result Value Ref Range    WBC 4.54 3.90 - 12.70 K/uL    RBC 5.67 4.60 - 6.20 M/uL    Hemoglobin 16.7 14.0 - 18.0 g/dL    Hematocrit 50.8 40.0 - 54.0 %    MCV 90 82 - 98 fL    MCH 29.5 27.0 - 31.0 pg    MCHC 32.9 32.0 - 36.0 g/dL    RDW 15.8 (H) 11.5 - 14.5 %     Platelets 158 150 - 450 K/uL    MPV 10.2 9.2 - 12.9 fL    Immature Granulocytes 0.2 0.0 - 0.5 %    Gran # (ANC) 2.9 1.8 - 7.7 K/uL    Immature Grans (Abs) 0.01 0.00 - 0.04 K/uL    Lymph # 1.2 1.0 - 4.8 K/uL    Mono # 0.2 (L) 0.3 - 1.0 K/uL    Eos # 0.2 0.0 - 0.5 K/uL    Baso # 0.04 0.00 - 0.20 K/uL    nRBC 0 0 /100 WBC    Gran % 62.9 38.0 - 73.0 %    Lymph % 26.7 18.0 - 48.0 %    Mono % 5.3 4.0 - 15.0 %    Eosinophil % 4.0 0.0 - 8.0 %    Basophil % 0.9 0.0 - 1.9 %    Differential Method Automated    Comprehensive metabolic panel   Result Value Ref Range    Sodium 143 136 - 145 mmol/L    Potassium 4.7 3.5 - 5.1 mmol/L    Chloride 102 95 - 110 mmol/L    CO2 25 23 - 29 mmol/L    Glucose 79 70 - 110 mg/dL    BUN 19 8 - 23 mg/dL    Creatinine 1.4 0.5 - 1.4 mg/dL    Calcium 9.7 8.7 - 10.5 mg/dL    Total Protein 8.0 6.0 - 8.4 g/dL    Albumin 3.9 3.5 - 5.2 g/dL    Total Bilirubin 0.4 0.1 - 1.0 mg/dL    Alkaline Phosphatase 107 55 - 135 U/L    AST 65 (H) 10 - 40 U/L    ALT 48 (H) 10 - 44 U/L    Anion Gap 16 8 - 16 mmol/L    eGFR if African American >60 >60 mL/min/1.73 m^2    eGFR if non African American 54 (A) >60 mL/min/1.73 m^2   Brain natriuretic peptide   Result Value Ref Range    BNP 2,884 (H) 0 - 99 pg/mL   Troponin I   Result Value Ref Range    Troponin I 0.144 (H) 0.000 - 0.026 ng/mL   HIV 1/2 Ag/Ab (4th Gen)   Result Value Ref Range    HIV 1/2 Ag/Ab Negative Negative   Hepatitis C Antibody   Result Value Ref Range    Hepatitis C Ab Negative Negative   HCV Virus Hold Specimen   Result Value Ref Range    HEP C Virus Hold Specimen Hold for HCV sendout    COVID-19 Rapid Screening   Result Value Ref Range    SARS-CoV-2 RNA, Amplification, Qual Negative Negative        Significant Imaging:   Imaging Results              CT Abdomen Pelvis With Contrast (Final result)  Result time 03/24/22 18:38:25      Final result by Carl Newby MD (03/24/22 18:38:25)                   Impression:      Trace bilateral pleural  effusions and probable pulmonary edema.  Correlation for volume overload.    Few scattered subsolid opacities possibly related to edema but viral pneumonitis not excluded.    No evidence of diverticulitis or other acute abdominal abnormality.    All CT scans at this facility are performed  using dose modulation techniques as appropriate to performed exam including the following:  automated exposure control; adjustment of mA and/or kV according to the patients size (this includes techniques or standardized protocols for targeted exams where dose is matched to indication/reason for exam: i.e. extremities or head);  iterative reconstruction technique.      Electronically signed by: Carl Newby  Date:    03/24/2022  Time:    18:38               Narrative:    EXAMINATION:  CT ABDOMEN PELVIS WITH CONTRAST    CLINICAL HISTORY:  LLQ abdominal pain;    TECHNIQUE:  Low dose axial images, sagittal and coronal reformations were obtained from the lung bases to the pubic symphysis.  Contrast was administered.  Images acquired after the administration 100 mL Omnipaque 350 IV contrast.    COMPARISON:  None    FINDINGS:  Heart: Normal in size. No pericardial effusion.    Lung Bases: Trace bilateral pleural effusions.  Probable mild pulmonary edema.  Few scattered subsolid opacities possibility to edema or viral pneumonitis.  Correlation is advised.    Liver: Normal in size and attenuation, with no focal hepatic lesions.    Gallbladder: No calcified gallstones.    Bile Ducts: No evidence of dilated ducts.    Pancreas: No mass or peripancreatic fat stranding.    Spleen: Unremarkable.    Adrenals: Unremarkable.    Kidneys/ Ureters: No obstructive uropathy.  No hydronephrosis.  Symmetric enhancement.  No nonobstructive nephrolithiasis.    Bladder: No evidence of wall thickening.    Reproductive organs: Mild prostatomegaly.    GI Tract/Mesentery: No evidence of bowel obstruction or inflammation.  Diverticulosis without definite  evidence of diverticulitis.  The appendix is nonvisualized.  No secondary signs of appendicitis.    Peritoneal Space: No ascites. No free air.    Retroperitoneum: No significant adenopathy.    Abdominal wall: Unremarkable.    Vasculature: Mild aortoiliac atherosclerosis.  No aneurysm.    Bones: No acute fracture.  Multifocal osseous degenerative changes.                                       X-Ray Chest 1 View (Final result)  Result time 03/24/22 17:07:37      Final result by Carl Newby MD (03/24/22 17:07:37)                   Impression:      No acute abnormality.      Electronically signed by: Carl Newby  Date:    03/24/2022  Time:    17:07               Narrative:    EXAMINATION:  XR CHEST 1 VIEW    CLINICAL HISTORY:  Shortness of breath    TECHNIQUE:  Single frontal view of the chest was performed.    COMPARISON:  None    FINDINGS:  The lungs are clear, with normal appearance of pulmonary vasculature and no pleural effusion or pneumothorax.    The cardiac silhouette is borderline enlarged.  The hilar and mediastinal contours are unremarkable.    Bones are intact.                                       Assessment/Plan:     * Hypertensive urgency  -Continue Cardene drip  -Will resume previous home antihypertensives once Cardene weaned off  -IV lasix 40 mg BID  -Consult Cardiology for management       Decompensated heart failure  -EF unknown  -Will obtain 2D Echo   -See medical management above       NSTEMI (non-ST elevated myocardial infarction)  - Troponin 0.144. CP free on admission.    - ASA, BB, and statin.    - Trend serial cardiac enzymes and EKG  -Will add heparin drip if troponin trends up  - Check FLP.  - Will obtain 2D echo  - Cardiology consult      VTE Risk Mitigation (From admission, onward)         Ordered     enoxaparin injection 40 mg  Daily         03/24/22 2258     IP VTE HIGH RISK PATIENT  Once         03/24/22 2258     Place sequential compression device  Until discontinued          03/24/22 0054              Critical care time spent on the evaluation and treatment of severe organ dysfunction, review of pertinent labs and imaging studies, discussions with consulting providers and discussions with patient/family: 35 minutes.     Zenia Jonas NP  Department of Hospital Medicine   Formerly [facility] - Intensive Care ([facility])

## 2022-03-25 NOTE — PLAN OF CARE
[facility] - Intensive Care (Hospital)  Initial Discharge Assessment       Primary Care Provider: No primary care provider on file.    Admission Diagnosis: Shortness of breath [R06.02]  Elevated troponin [R77.8]  Hypertensive emergency [I16.1]  Acute congestive heart failure, unspecified heart failure type [I50.9]    Admission Date: 3/24/2022  Expected Discharge Date:     Discharge Barriers Identified: None    Payor: [employer] / Plan: [employer] POS / Product Type: PPO /     Extended Emergency Contact Information  Primary Emergency Contact: Breonna Person  Home Phone: [phone]  Relation: Mother  Preferred language: English   needed? No  Secondary Emergency Contact: Nathalie Kang  Mobile Phone: [phone]  Relation: Daughter  Preferred language: English   needed? No    Discharge Plan A: Home with family       No Pharmacies Listed    Initial Assessment (most recent)     Adult Discharge Assessment - 03/25/22 1100        Discharge Assessment    Assessment Type Discharge Planning Assessment     Confirmed/corrected address, phone number and insurance Yes     Confirmed Demographics Correct on Facesheet     Source of Information patient     Reason For Admission Hypertensive emergency     Lives With parent(s)     Facility Arrived From: home     Do you expect to return to your current living situation? Yes     Do you have help at home or someone to help you manage your care at home? Yes     Who are your caregiver(s) and their phone number(s)? Breonna Person (Mother)     Prior to hospitilization cognitive status: Alert/Oriented     Current cognitive status: Alert/Oriented     Walking or Climbing Stairs Difficulty none     Dressing/Bathing Difficulty none     Home Accessibility wheelchair accessible     Equipment Currently Used at Home none     Readmission within 30 days? No     Patient currently being followed by outpatient case management? No     Do you currently have service(s) that help you manage your care at  home? No     Do you take prescription medications? Yes     Do you have prescription coverage? Yes     Do you have any problems affording any of your prescribed medications? No     Is the patient taking medications as prescribed? yes     Who is going to help you get home at discharge? Nathalie, daughter     How do you get to doctors appointments? car, drives self     Are you on dialysis? No     Do you take coumadin? No     Discharge Plan A Home with family     DME Needed Upon Discharge  none     Discharge Plan discussed with: Patient     Discharge Barriers Identified None        Relationship/Environment    Name(s) of Who Lives With Patient Breonna Person (Mother)               Anticipated DC dispo: home with family   Prior Level of Function: independent, lives with mother   PCP: unknown     Comments:  CM met with patient at bedside to introduce role and discuss discharge planning. Patient lives with his mother who will be help at home. Daughter or other family can provide transport at time of discharge. Patient does not know who his PCP is. CM discharge needs depends on hospital progress. CM will continue following to assist with other needs.

## 2022-03-25 NOTE — ASSESSMENT & PLAN NOTE
-EF unknown  -Will obtain 2D Echo   -See medical management above   3/25-  -Echo in progress   -Continue BB  -Will start ACEi/ARB once renal function permit

## 2022-03-25 NOTE — ASSESSMENT & PLAN NOTE
-Continue Cardene drip  -Will resume previous home antihypertensives once Cardene weaned off  -IV lasix 40 mg BID  -Consult Cardiology for management

## 2022-03-25 NOTE — PROGRESS NOTES
[facility] - Intensive Care ([facility])  [facility]  Progress Note    Patient Name: Vipin Person Jr.  MRN: [id number]  Patient Class: IP- Inpatient   Admission Date: 3/24/2022  Length of Stay: 1 days  Attending Physician: Astrid Saez MD  Primary Care Provider: No primary care provider on file.        Subjective:     Principal Problem:Hypertensive emergency        HPI:  Vipin Person Jr. is a 61-year-old male  male with a PMHx of HTN who presents to [facility] ED for evaluation of periumbilical abdominal pain which onset 1 week ago. States since he has a history of diverticulosis, he assumed the pain he was experiencing was related to that and simply planned for a PCP follow-up regarding the possible diverticulosis pain and refill of HTN medications on next Monday. However, two days ago he gradually started experiencing shortness of breath that significantly began worsening today and he decided he could not wait until Monday to see the PCP and came to the ED right away. Reports he has been out of his HTN medication for over two months while he had been away in [place]. Patient denies fever, H/A, nausea, vomiting, chest pain, chills, diarrhea, or constipation. CT of Abdomen/Pelvis which showed trace bilateral pleural effusions and probable pulmonary edema. Correlation for volume overload. Few scattered subsolid opacities possibly related to edema but viral pneumonitis not excluded. No evidence of diverticulitis or other acute abdominal abnormality. EKG showed sinus tachycardia. Labs included BNP 2,844, Troponin 0.144. He is a Full Code status and his surrogate decision maker is his mother, Breonna Person at [phone]. He is admitted to Hospital Medicine and placed in ICU for close monitoring.       Overview/Hospital Course:  Admitted to ICU on Cardene gtt for Hypertensive emergency . BP was 180/130 on admit associated with pulmonary edema , elevated BNP and elevated troponin. Pt started on ASA, statin, BB  and IV lasix.. Cardiology was consulted . Additionally pt had periumbilical abdominal pain on presentation which has resolved after administration of 6 mg Morphine IV in the ED. CT abd /plevis with intraabdominal acute process.     3/25- Pt is seen at bedside in ICU. Fully awake , alert, Ox3. Off Cardene gtt. Admits to not taking home BP meds for at least 2 mos. Denies chest pain. SOB has improved and denies further abd pain. Denies constipation. Troponin elevation is flat, peaked at 0.144 down to 0.125. Oral antihypertensives include BB, Hydralazine and Imdur initiated . Creatinine 1.3. Metabolic alkalosis is noted. Diuretic transitioned to oral Furosemide. Await Cardiology input. Downgrade to telemetry. Echo in progress.       Interval History:  Troponin elevation is flat, peaked at 0.144 down to 0.125. Oral antihypertensives include BB, Hydralazine and Imdur initiated . Creatinine 1.3. Metabolic alkalosis is noted. Diuretic transitioned to oral Furosemide. Await Cardiology input. Downgrade to telemetry. Echo in progress.       Review of Systems   Constitutional:  Negative for activity change, appetite change and fever.   HENT:  Negative for sore throat.    Eyes:  Negative for visual disturbance.   Respiratory:  Negative for cough, chest tightness and shortness of breath.    Cardiovascular:  Negative for chest pain, palpitations and leg swelling.   Gastrointestinal:  Positive for abdominal pain (resolved). Negative for abdominal distention, constipation, diarrhea, nausea and vomiting.   Endocrine: Negative for polyuria.   Genitourinary:  Negative for decreased urine volume, dysuria, flank pain, frequency and hematuria.   Musculoskeletal:  Negative for back pain and gait problem.   Skin:  Negative for rash.   Neurological:  Negative for syncope, speech difficulty, weakness, light-headedness and headaches.   Psychiatric/Behavioral:  Negative for confusion, hallucinations and sleep disturbance.    Objective:      Vital Signs (Most Recent):  Temp: 97.9 °F (36.6 °C) (03/25/22 1110)  Pulse: 71 (03/25/22 1100)  Resp: (!) 34 (03/25/22 1100)  BP: 111/76 (03/25/22 1100)  SpO2: 97 % (03/25/22 1100)   Vital Signs (24h Range):  Temp:  [97.4 °F (36.3 °C)-98 °F (36.7 °C)] 97.9 °F (36.6 °C)  Pulse:  [] 71  Resp:  [19-54] 34  SpO2:  [87 %-100 %] 97 %  BP: (111-180)/() 111/76     Weight: 58.5 kg (129 lb)  Body mass index is 22.14 kg/m².    Intake/Output Summary (Last 24 hours) at 3/25/2022 1249  Last data filed at 3/25/2022 0819  Gross per 24 hour   Intake 55.39 ml   Output 3525 ml   Net -3469.61 ml      Physical Exam  Constitutional:       General: He is not in acute distress.     Appearance: He is well-developed. He is not diaphoretic.   HENT:      Head: Normocephalic and atraumatic.      Mouth/Throat:      Pharynx: No oropharyngeal exudate.   Eyes:      Conjunctiva/sclera: Conjunctivae normal.      Pupils: Pupils are equal, round, and reactive to light.   Neck:      Thyroid: No thyromegaly.      Vascular: No JVD.   Cardiovascular:      Rate and Rhythm: Normal rate and regular rhythm.      Heart sounds: Normal heart sounds. No murmur heard.  Pulmonary:      Effort: Pulmonary effort is normal. No respiratory distress.      Breath sounds: Normal breath sounds. No wheezing or rales.   Chest:      Chest wall: No tenderness.   Abdominal:      General: Bowel sounds are normal. There is no distension.      Palpations: Abdomen is soft.      Tenderness: There is no abdominal tenderness. There is no guarding or rebound.   Musculoskeletal:         General: Normal range of motion.      Cervical back: Normal range of motion and neck supple.   Lymphadenopathy:      Cervical: No cervical adenopathy.   Skin:     General: Skin is warm and dry.      Findings: No rash.   Neurological:      Mental Status: He is alert and oriented to person, place, and time.      Cranial Nerves: No cranial nerve deficit.      Sensory: No sensory deficit.       Deep Tendon Reflexes: Reflexes normal.       Significant Labs: All pertinent labs within the past 24 hours have been reviewed.  BMP:   Recent Labs   Lab 03/25/22  0402   GLU 95      K 3.9   CL 96   CO2 31*   BUN 18   CREATININE 1.3   CALCIUM 10.2     CBC:   Recent Labs   Lab 03/24/22  1715 03/25/22  0402   WBC 4.54 5.61   HGB 16.7 13.1*   HCT 50.8 39.5*    271     CMP:   Recent Labs   Lab 03/24/22  1715 03/25/22  0402    143   K 4.7 3.9    96   CO2 25 31*   GLU 79 95   BUN 19 18   CREATININE 1.4 1.3   CALCIUM 9.7 10.2   PROT 8.0 7.5   ALBUMIN 3.9 3.8   BILITOT 0.4 0.4   ALKPHOS 107 108   AST 65* 44*   ALT 48* 43   ANIONGAP 16 16   EGFRNONAA 54* 59*     Cardiac Markers:   Recent Labs   Lab 03/24/22 1715   BNP 2,884*       Significant Imaging:       Assessment/Plan:      * Hypertensive emergency  -Continue Cardene drip  -Will resume previous home antihypertensives once Cardene weaned off  -IV lasix 40 mg BID  -Consult Cardiology for management   3/25-  -Cardene gtt weaned off  -Oral antihypertensives initiated and assess response   -Downgrade to telemetry         Decompensated heart failure  -EF unknown  -Will obtain 2D Echo   -See medical management above   3/25-  -Echo in progress   -Continue BB  -Will start ACEi/ARB once renal function permit         NSTEMI (non-ST elevated myocardial infarction)  - Troponin 0.144. CP free on admission.    -EKG with LVH by voltage criteria and non specific ST/T wave changes   - ASA, BB, and statin initiated   - Trend serial cardiac enzymes and EKG  -Will add heparin drip if troponin trends up  - Check FLP.  - Will obtain 2D echo  - Cardiology consult  3/25-  -Likely type 2 in the setting of uncontrolled HTN with Hypertensive emergency   -Await further input from Cardiology       Renal insufficiency  - YANG vs CKD  -Baseline creatinine is unknown . Was 1.0 in 2015 per [facility]   -Monitor creatine trend and urine output  -Avoid nephrotoxic agents   -No  obstructive uropathy.  No hydronephrosis per Ct abd/pelvis     Periumbilical abdominal pain, unspecified   - Currently improved and denies further pain  -Tolerating diet   -CT abd/pelvis with no acute intraabdominal process         VTE Risk Mitigation (From admission, onward)         Ordered     enoxaparin injection 40 mg  Daily         03/24/22 2258     IP VTE HIGH RISK PATIENT  Once         03/24/22 2258     Place sequential compression device  Until discontinued         03/24/22 2258                Discharge Planning   KANDI:      Code Status: Full Code   Is the patient medically ready for discharge?:     Reason for patient still in hospital (select all that apply): Patient trending condition  Discharge Plan A: Home with family            Critical care time spent on the evaluation and treatment of severe organ dysfunction, review of pertinent labs and imaging studies, discussions with consulting providers and discussions with patient/family: 30  minutes.      Astrid Saez MD  Department of Hospital Medicine   '[place] - Intensive Care ([facility])

## 2022-03-25 NOTE — HPI
Vipin Person Jr. is a 61-year-old male  male with a PMHx of HTN who presents to [facility] ED for evaluation of periumbilical abdominal pain which onset 1 week ago. States since he has a history of diverticulosis, he assumed the pain he was experiencing was related to that and simply planned for a PCP follow-up regarding the possible diverticulosis pain and refill of HTN medications on next Monday. However, two days ago he gradually started experiencing shortness of breath that significantly began worsening today and he decided he could not wait until Monday to see the PCP and came to the ED right away. Reports he has been out of his HTN medication for over two months while he had been away in [place]. Patient denies fever, H/A, nausea, vomiting, chest pain, chills, diarrhea, or constipation. CT of Abdomen/Pelvis which showed trace bilateral pleural effusions and probable pulmonary edema. Correlation for volume overload. Few scattered subsolid opacities possibly related to edema but viral pneumonitis not excluded. No evidence of diverticulitis or other acute abdominal abnormality. EKG showed sinus tachycardia. Labs included BNP 2,844, Troponin 0.144. He is a Full Code status and his surrogate decision maker is his mother, Breonna Person at [phone]. He is admitted to Hospital Medicine and placed in ICU for close monitoring.

## 2022-03-25 NOTE — SUBJECTIVE & OBJECTIVE
No past medical history on file.    No past surgical history on file.    Review of patient's allergies indicates:  No Known Allergies    No current facility-administered medications on file prior to encounter.     No current outpatient medications on file prior to encounter.     Family History    None       Tobacco Use    Smoking status: Not on file    Smokeless tobacco: Not on file   Substance and Sexual Activity    Alcohol use: Not on file    Drug use: Not on file    Sexual activity: Not on file     Review of Systems   Constitutional: Negative.    HENT:  Negative for congestion, sinus pressure and sinus pain.    Eyes: Negative.    Respiratory:  Positive for shortness of breath. Negative for cough, chest tightness and wheezing.    Cardiovascular:  Negative for chest pain, palpitations and leg swelling.   Gastrointestinal:  Positive for abdominal pain. Negative for diarrhea, nausea and vomiting.   Endocrine: Negative.    Genitourinary: Negative.    Musculoskeletal: Negative.    Skin: Negative.    Allergic/Immunologic: Negative.    Neurological:  Negative for dizziness, weakness, light-headedness and headaches.   Hematological: Negative.    Psychiatric/Behavioral:  Negative for confusion and decreased concentration.    Objective:     Vital Signs (Most Recent):  Temp: 97.8 °F (36.6 °C) (03/24/22 2130)  Pulse: 105 (03/24/22 2200)  Resp: (!) 54 (03/24/22 2200)  BP: 124/89 (03/24/22 2200)  SpO2: 96 % (03/24/22 2200)   Vital Signs (24h Range):  Temp:  [97.8 °F (36.6 °C)-98 °F (36.7 °C)] 97.8 °F (36.6 °C)  Pulse:  [] 105  Resp:  [20-54] 54  SpO2:  [95 %-97 %] 96 %  BP: (124-180)/() 124/89     Weight: 58.9 kg (129 lb 13.6 oz)  Body mass index is 22.29 kg/m².    Physical Exam  Vitals and nursing note reviewed.   Constitutional:       General: He is awake.      Appearance: Normal appearance. He is well-developed. He is ill-appearing.   HENT:      Head: Normocephalic and atraumatic.      Nose: Nose normal.       Mouth/Throat:      Mouth: Mucous membranes are moist.   Eyes:      Extraocular Movements: Extraocular movements intact.      Pupils: Pupils are equal, round, and reactive to light.   Cardiovascular:      Rate and Rhythm: Tachycardia present.   Pulmonary:      Effort: Tachypnea present.      Breath sounds: Examination of the right-lower field reveals decreased breath sounds. Examination of the left-lower field reveals decreased breath sounds and wheezing. Decreased breath sounds and wheezing present.   Abdominal:      General: Bowel sounds are normal.      Palpations: Abdomen is soft.      Tenderness: There is no abdominal tenderness.   Musculoskeletal:      Cervical back: Full passive range of motion without pain, normal range of motion and neck supple.      Right lower leg: No edema.      Left lower leg: No edema.   Skin:     General: Skin is warm.      Capillary Refill: Capillary refill takes less than 2 seconds.   Neurological:      Mental Status: He is alert and oriented to person, place, and time.      Cranial Nerves: Cranial nerves are intact.   Psychiatric:         Attention and Perception: Attention normal.         Mood and Affect: Mood normal.         Speech: Speech normal.         Behavior: Behavior normal. Behavior is cooperative.         Cognition and Memory: Cognition normal.         Judgment: Judgment normal.         CRANIAL NERVES     CN III, IV, VI   Pupils are equal, round, and reactive to light.     Significant Labs:   Results for orders placed or performed during the hospital encounter of 03/24/22   CBC auto differential   Result Value Ref Range    WBC 4.54 3.90 - 12.70 K/uL    RBC 5.67 4.60 - 6.20 M/uL    Hemoglobin 16.7 14.0 - 18.0 g/dL    Hematocrit 50.8 40.0 - 54.0 %    MCV 90 82 - 98 fL    MCH 29.5 27.0 - 31.0 pg    MCHC 32.9 32.0 - 36.0 g/dL    RDW 15.8 (H) 11.5 - 14.5 %    Platelets 158 150 - 450 K/uL    MPV 10.2 9.2 - 12.9 fL    Immature Granulocytes 0.2 0.0 - 0.5 %    Gran # (ANC) 2.9 1.8  - 7.7 K/uL    Immature Grans (Abs) 0.01 0.00 - 0.04 K/uL    Lymph # 1.2 1.0 - 4.8 K/uL    Mono # 0.2 (L) 0.3 - 1.0 K/uL    Eos # 0.2 0.0 - 0.5 K/uL    Baso # 0.04 0.00 - 0.20 K/uL    nRBC 0 0 /100 WBC    Gran % 62.9 38.0 - 73.0 %    Lymph % 26.7 18.0 - 48.0 %    Mono % 5.3 4.0 - 15.0 %    Eosinophil % 4.0 0.0 - 8.0 %    Basophil % 0.9 0.0 - 1.9 %    Differential Method Automated    Comprehensive metabolic panel   Result Value Ref Range    Sodium 143 136 - 145 mmol/L    Potassium 4.7 3.5 - 5.1 mmol/L    Chloride 102 95 - 110 mmol/L    CO2 25 23 - 29 mmol/L    Glucose 79 70 - 110 mg/dL    BUN 19 8 - 23 mg/dL    Creatinine 1.4 0.5 - 1.4 mg/dL    Calcium 9.7 8.7 - 10.5 mg/dL    Total Protein 8.0 6.0 - 8.4 g/dL    Albumin 3.9 3.5 - 5.2 g/dL    Total Bilirubin 0.4 0.1 - 1.0 mg/dL    Alkaline Phosphatase 107 55 - 135 U/L    AST 65 (H) 10 - 40 U/L    ALT 48 (H) 10 - 44 U/L    Anion Gap 16 8 - 16 mmol/L    eGFR if African American >60 >60 mL/min/1.73 m^2    eGFR if non African American 54 (A) >60 mL/min/1.73 m^2   Brain natriuretic peptide   Result Value Ref Range    BNP 2,884 (H) 0 - 99 pg/mL   Troponin I   Result Value Ref Range    Troponin I 0.144 (H) 0.000 - 0.026 ng/mL   HIV 1/2 Ag/Ab (4th Gen)   Result Value Ref Range    HIV 1/2 Ag/Ab Negative Negative   Hepatitis C Antibody   Result Value Ref Range    Hepatitis C Ab Negative Negative   HCV Virus Hold Specimen   Result Value Ref Range    HEP C Virus Hold Specimen Hold for HCV sendout    COVID-19 Rapid Screening   Result Value Ref Range    SARS-CoV-2 RNA, Amplification, Qual Negative Negative        Significant Imaging:   Imaging Results              CT Abdomen Pelvis With Contrast (Final result)  Result time 03/24/22 18:38:25      Final result by Carl Newby MD (03/24/22 18:38:25)                   Impression:      Trace bilateral pleural effusions and probable pulmonary edema.  Correlation for volume overload.    Few scattered subsolid opacities possibly related  to edema but viral pneumonitis not excluded.    No evidence of diverticulitis or other acute abdominal abnormality.    All CT scans at this facility are performed  using dose modulation techniques as appropriate to performed exam including the following:  automated exposure control; adjustment of mA and/or kV according to the patients size (this includes techniques or standardized protocols for targeted exams where dose is matched to indication/reason for exam: i.e. extremities or head);  iterative reconstruction technique.      Electronically signed by: Carl Newby  Date:    03/24/2022  Time:    18:38               Narrative:    EXAMINATION:  CT ABDOMEN PELVIS WITH CONTRAST    CLINICAL HISTORY:  LLQ abdominal pain;    TECHNIQUE:  Low dose axial images, sagittal and coronal reformations were obtained from the lung bases to the pubic symphysis.  Contrast was administered.  Images acquired after the administration 100 mL Omnipaque 350 IV contrast.    COMPARISON:  None    FINDINGS:  Heart: Normal in size. No pericardial effusion.    Lung Bases: Trace bilateral pleural effusions.  Probable mild pulmonary edema.  Few scattered subsolid opacities possibility to edema or viral pneumonitis.  Correlation is advised.    Liver: Normal in size and attenuation, with no focal hepatic lesions.    Gallbladder: No calcified gallstones.    Bile Ducts: No evidence of dilated ducts.    Pancreas: No mass or peripancreatic fat stranding.    Spleen: Unremarkable.    Adrenals: Unremarkable.    Kidneys/ Ureters: No obstructive uropathy.  No hydronephrosis.  Symmetric enhancement.  No nonobstructive nephrolithiasis.    Bladder: No evidence of wall thickening.    Reproductive organs: Mild prostatomegaly.    GI Tract/Mesentery: No evidence of bowel obstruction or inflammation.  Diverticulosis without definite evidence of diverticulitis.  The appendix is nonvisualized.  No secondary signs of appendicitis.    Peritoneal Space: No ascites. No  free air.    Retroperitoneum: No significant adenopathy.    Abdominal wall: Unremarkable.    Vasculature: Mild aortoiliac atherosclerosis.  No aneurysm.    Bones: No acute fracture.  Multifocal osseous degenerative changes.                                       X-Ray Chest 1 View (Final result)  Result time 03/24/22 17:07:37      Final result by Carl Newby MD (03/24/22 17:07:37)                   Impression:      No acute abnormality.      Electronically signed by: Carl Newby  Date:    03/24/2022  Time:    17:07               Narrative:    EXAMINATION:  XR CHEST 1 VIEW    CLINICAL HISTORY:  Shortness of breath    TECHNIQUE:  Single frontal view of the chest was performed.    COMPARISON:  None    FINDINGS:  The lungs are clear, with normal appearance of pulmonary vasculature and no pleural effusion or pneumothorax.    The cardiac silhouette is borderline enlarged.  The hilar and mediastinal contours are unremarkable.    Bones are intact.

## 2022-03-25 NOTE — PROGRESS NOTES
EICU BRIEF ADMIT NOTE:    HISTORY: Please refer to H/P and ER notes for detail.     CAMERA ASSESSMENT: Two way audiovisual assessment was done: no distress.    Telemetry was reviewed. Medical records including notes, labs and imaging were reviewed.    DISCUSSED with bedside nurse.    ASSESSMENT AND PLAN:    # Hypertensive emergency, pulmonary edema  --On nicardipine infusion, blood pressure has improved  --Lasix given in the ED, patient denies any shortness of breath.  On room air now  --Elevated troponin, EKG without any acute changes.  Continue to trend    BEST PRACTICES REVIEW:    STRESS ULCER PROPHYLAXIS: not indicated  DVT PROPHYLAXIS:   SCD    Thank You for allowing EICU to participate in the care of the patient. Please call as needed      Blayne Goode MD  [facility]  [phone]

## 2022-03-25 NOTE — ASSESSMENT & PLAN NOTE
- YANG vs CKD  -Baseline creatinine is unknown . Was 1.0 in 2015 per [facility]   -Monitor creatine trend and urine output  -Avoid nephrotoxic agents   -No obstructive uropathy.  No hydronephrosis per Ct abd/pelvis

## 2022-03-26 PROBLEM — N17.9 AKI (ACUTE KIDNEY INJURY): Status: ACTIVE | Noted: 2022-03-26

## 2022-03-26 PROBLEM — F10.10 ALCOHOL ABUSE, EPISODIC DRINKING BEHAVIOR: Status: ACTIVE | Noted: 2022-03-26

## 2022-03-26 LAB
ALBUMIN SERPL BCP-MCNC: 3.4 G/DL (ref 3.5–5.2)
ALP SERPL-CCNC: 96 U/L (ref 55–135)
ALT SERPL W/O P-5'-P-CCNC: 32 U/L (ref 10–44)
ANION GAP SERPL CALC-SCNC: 14 MMOL/L (ref 8–16)
AST SERPL-CCNC: 34 U/L (ref 10–40)
BASOPHILS # BLD AUTO: 0.08 K/UL (ref 0–0.2)
BASOPHILS NFR BLD: 1.3 % (ref 0–1.9)
BILIRUB SERPL-MCNC: 0.2 MG/DL (ref 0.1–1)
BNP SERPL-MCNC: 566 PG/ML (ref 0–99)
BUN SERPL-MCNC: 32 MG/DL (ref 8–23)
CALCIUM SERPL-MCNC: 9.1 MG/DL (ref 8.7–10.5)
CHLORIDE SERPL-SCNC: 96 MMOL/L (ref 95–110)
CO2 SERPL-SCNC: 29 MMOL/L (ref 23–29)
CREAT SERPL-MCNC: 1.7 MG/DL (ref 0.5–1.4)
CV STRESS BASE HR: 70 BPM
DIASTOLIC BLOOD PRESSURE: 92 MMHG
DIFFERENTIAL METHOD: ABNORMAL
END DIASTOLIC INDEX-HIGH: 170 ML/M2
END SYSTOLIC INDEX-HIGH: 70 ML/M2
EOSINOPHIL # BLD AUTO: 0.3 K/UL (ref 0–0.5)
EOSINOPHIL NFR BLD: 4.2 % (ref 0–8)
ERYTHROCYTE [DISTWIDTH] IN BLOOD BY AUTOMATED COUNT: 15.2 % (ref 11.5–14.5)
EST. GFR  (AFRICAN AMERICAN): 49 ML/MIN/1.73 M^2
EST. GFR  (NON AFRICAN AMERICAN): 43 ML/MIN/1.73 M^2
ESTIMATED AVG GLUCOSE: 111 MG/DL (ref 68–131)
GLUCOSE SERPL-MCNC: 124 MG/DL (ref 70–110)
HBA1C MFR BLD: 5.5 % (ref 4–5.6)
HCT VFR BLD AUTO: 39 % (ref 40–54)
HGB BLD-MCNC: 12.7 G/DL (ref 14–18)
IMM GRANULOCYTES # BLD AUTO: 0.02 K/UL (ref 0–0.04)
IMM GRANULOCYTES NFR BLD AUTO: 0.3 % (ref 0–0.5)
LYMPHOCYTES # BLD AUTO: 2.3 K/UL (ref 1–4.8)
LYMPHOCYTES NFR BLD: 38.2 % (ref 18–48)
MAGNESIUM SERPL-MCNC: 1.6 MG/DL (ref 1.6–2.6)
MCH RBC QN AUTO: 30.1 PG (ref 27–31)
MCHC RBC AUTO-ENTMCNC: 32.6 G/DL (ref 32–36)
MCV RBC AUTO: 92 FL (ref 82–98)
MONOCYTES # BLD AUTO: 0.5 K/UL (ref 0.3–1)
MONOCYTES NFR BLD: 8.2 % (ref 4–15)
NEUTROPHILS # BLD AUTO: 2.8 K/UL (ref 1.8–7.7)
NEUTROPHILS NFR BLD: 47.8 % (ref 38–73)
NRBC BLD-RTO: 0 /100 WBC
NUC REST DIASTOLIC VOLUME INDEX: 158
NUC REST EJECTION FRACTION: 37
NUC REST SYSTOLIC VOLUME INDEX: 100
NUC STRESS DIASTOLIC VOLUME INDEX: 176
NUC STRESS EJECTION FRACTION: 35 %
NUC STRESS SYSTOLIC VOLUME INDEX: 114
OHS CV CPX 85 PERCENT MAX PREDICTED HEART RATE MALE: 135
OHS CV CPX MAX PREDICTED HEART RATE: 159
OHS CV CPX PATIENT IS FEMALE: 0
OHS CV CPX PATIENT IS MALE: 1
OHS CV CPX PEAK DIASTOLIC BLOOD PRESSURE: 93 MMHG
OHS CV CPX PEAK HEAR RATE: 100 BPM
OHS CV CPX PEAK RATE PRESSURE PRODUCT: NORMAL
OHS CV CPX PEAK SYSTOLIC BLOOD PRESSURE: 126 MMHG
OHS CV CPX PERCENT MAX PREDICTED HEART RATE ACHIEVED: 63
OHS CV CPX RATE PRESSURE PRODUCT PRESENTING: 8750
PHOSPHATE SERPL-MCNC: 4.6 MG/DL (ref 2.7–4.5)
PLATELET # BLD AUTO: 256 K/UL (ref 150–450)
PMV BLD AUTO: 10.7 FL (ref 9.2–12.9)
POTASSIUM SERPL-SCNC: 4.2 MMOL/L (ref 3.5–5.1)
PROT SERPL-MCNC: 7.2 G/DL (ref 6–8.4)
RBC # BLD AUTO: 4.22 M/UL (ref 4.6–6.2)
RETIRED EF AND QEF - SEE NOTES: 51 %
SODIUM SERPL-SCNC: 139 MMOL/L (ref 136–145)
SYSTOLIC BLOOD PRESSURE: 125 MMHG
TROPONIN I SERPL DL<=0.01 NG/ML-MCNC: 0.11 NG/ML (ref 0–0.03)
WBC # BLD AUTO: 5.94 K/UL (ref 3.9–12.7)

## 2022-03-26 PROCEDURE — 63600175 PHARM REV CODE 636 W HCPCS: Performed by: STUDENT IN AN ORGANIZED HEALTH CARE EDUCATION/TRAINING PROGRAM

## 2022-03-26 PROCEDURE — 25000003 PHARM REV CODE 250: Performed by: NURSE PRACTITIONER

## 2022-03-26 PROCEDURE — 99233 SBSQ HOSP IP/OBS HIGH 50: CPT | Mod: ,,, | Performed by: STUDENT IN AN ORGANIZED HEALTH CARE EDUCATION/TRAINING PROGRAM

## 2022-03-26 PROCEDURE — 83036 HEMOGLOBIN GLYCOSYLATED A1C: CPT | Performed by: STUDENT IN AN ORGANIZED HEALTH CARE EDUCATION/TRAINING PROGRAM

## 2022-03-26 PROCEDURE — 84100 ASSAY OF PHOSPHORUS: CPT | Performed by: INTERNAL MEDICINE

## 2022-03-26 PROCEDURE — 84484 ASSAY OF TROPONIN QUANT: CPT | Performed by: INTERNAL MEDICINE

## 2022-03-26 PROCEDURE — 21400001 HC TELEMETRY ROOM

## 2022-03-26 PROCEDURE — 83880 ASSAY OF NATRIURETIC PEPTIDE: CPT | Performed by: INTERNAL MEDICINE

## 2022-03-26 PROCEDURE — 83735 ASSAY OF MAGNESIUM: CPT | Performed by: INTERNAL MEDICINE

## 2022-03-26 PROCEDURE — 36415 COLL VENOUS BLD VENIPUNCTURE: CPT | Performed by: NURSE PRACTITIONER

## 2022-03-26 PROCEDURE — 99233 PR SUBSEQUENT HOSPITAL CARE,LEVL III: ICD-10-PCS | Mod: ,,, | Performed by: STUDENT IN AN ORGANIZED HEALTH CARE EDUCATION/TRAINING PROGRAM

## 2022-03-26 PROCEDURE — 85025 COMPLETE CBC W/AUTO DIFF WBC: CPT | Performed by: NURSE PRACTITIONER

## 2022-03-26 PROCEDURE — 25000003 PHARM REV CODE 250: Performed by: STUDENT IN AN ORGANIZED HEALTH CARE EDUCATION/TRAINING PROGRAM

## 2022-03-26 PROCEDURE — 25000003 PHARM REV CODE 250: Performed by: INTERNAL MEDICINE

## 2022-03-26 PROCEDURE — 80053 COMPREHEN METABOLIC PANEL: CPT | Performed by: NURSE PRACTITIONER

## 2022-03-26 RX ORDER — THIAMINE HCL 100 MG
100 TABLET ORAL DAILY
Status: DISCONTINUED | OUTPATIENT
Start: 2022-03-27 | End: 2022-03-27 | Stop reason: HOSPADM

## 2022-03-26 RX ORDER — LOSARTAN POTASSIUM 25 MG/1
25 TABLET ORAL DAILY
Status: DISCONTINUED | OUTPATIENT
Start: 2022-03-27 | End: 2022-03-26

## 2022-03-26 RX ORDER — ISOSORBIDE DINITRATE 5 MG/1
5 TABLET ORAL 3 TIMES DAILY
Status: DISCONTINUED | OUTPATIENT
Start: 2022-03-26 | End: 2022-03-27

## 2022-03-26 RX ORDER — FOLIC ACID 1 MG/1
1 TABLET ORAL DAILY
Status: DISCONTINUED | OUTPATIENT
Start: 2022-03-27 | End: 2022-03-27 | Stop reason: HOSPADM

## 2022-03-26 RX ORDER — HYDRALAZINE HYDROCHLORIDE 10 MG/1
10 TABLET, FILM COATED ORAL EVERY 12 HOURS
Status: DISCONTINUED | OUTPATIENT
Start: 2022-03-27 | End: 2022-03-27

## 2022-03-26 RX ORDER — LANOLIN ALCOHOL/MO/W.PET/CERES
400 CREAM (GRAM) TOPICAL 2 TIMES DAILY
Status: DISCONTINUED | OUTPATIENT
Start: 2022-03-26 | End: 2022-03-27 | Stop reason: HOSPADM

## 2022-03-26 RX ORDER — LANOLIN ALCOHOL/MO/W.PET/CERES
800 CREAM (GRAM) TOPICAL ONCE
Status: COMPLETED | OUTPATIENT
Start: 2022-03-26 | End: 2022-03-26

## 2022-03-26 RX ADMIN — ACETAMINOPHEN 650 MG: 325 TABLET ORAL at 06:03

## 2022-03-26 RX ADMIN — REGADENOSON 0.4 MG: 0.08 INJECTION, SOLUTION INTRAVENOUS at 01:03

## 2022-03-26 RX ADMIN — HYDRALAZINE HYDROCHLORIDE 10 MG: 10 TABLET, FILM COATED ORAL at 03:03

## 2022-03-26 RX ADMIN — HYDRALAZINE HYDROCHLORIDE 10 MG: 10 TABLET, FILM COATED ORAL at 06:03

## 2022-03-26 RX ADMIN — ISOSORBIDE DINITRATE 5 MG: 5 TABLET ORAL at 08:03

## 2022-03-26 RX ADMIN — MAGNESIUM OXIDE TAB 400 MG (241.3 MG ELEMENTAL MG) 400 MG: 400 (241.3 MG) TAB at 08:03

## 2022-03-26 RX ADMIN — ISOSORBIDE MONONITRATE 30 MG: 30 TABLET, EXTENDED RELEASE ORAL at 08:03

## 2022-03-26 RX ADMIN — MUPIROCIN: 20 OINTMENT TOPICAL at 08:03

## 2022-03-26 RX ADMIN — MAGNESIUM OXIDE TAB 400 MG (241.3 MG ELEMENTAL MG) 800 MG: 400 (241.3 MG) TAB at 08:03

## 2022-03-26 RX ADMIN — METOPROLOL TARTRATE 25 MG: 25 TABLET, FILM COATED ORAL at 08:03

## 2022-03-26 RX ADMIN — ATORVASTATIN CALCIUM 40 MG: 40 TABLET, FILM COATED ORAL at 08:03

## 2022-03-26 RX ADMIN — ASPIRIN 81 MG: 81 TABLET, COATED ORAL at 08:03

## 2022-03-26 NOTE — ASSESSMENT & PLAN NOTE
- YANG vs CKD  -Baseline creatinine is unknown . Was 1.0 in 2015 per [facility]   -Monitor creatine trend and urine output  -Avoid nephrotoxic agents   -No obstructive uropathy.  No hydronephrosis per Ct abd/pelvis   3/26-  -Creatinine increased further   -Lasix held as pt clinically appears compensated   -ACEi or ARB not initiated   -Monitor renal indices

## 2022-03-26 NOTE — ASSESSMENT & PLAN NOTE
- Currently improved and denies further pain  -Tolerating diet   -CT abd/pelvis with no acute intraabdominal process   3/26-  -Resolved

## 2022-03-26 NOTE — ASSESSMENT & PLAN NOTE
-EF unknown  -Will obtain 2D Echo   -See medical management above   3/25-  -Echo in progress   -Continue BB  -Will start ACEi/ARB once renal function permit   3/26-  -Echo resulted LVEF 30%, LV global hypokinesis , G1DD, mildly reduced RV systolic function.  - Cardiology consult obtained and suggested MPI stress test today for ischemic workup.   -ARB/ACEi not initiated due to bumped creatinine 1.7>1.4.   -Lasix held  -Continue BB

## 2022-03-26 NOTE — ASSESSMENT & PLAN NOTE
- Pt reports episodic excessive alcohol use , consume beer and hard liquor on weekend and on special occasions and gets drunk . Denies dependency

## 2022-03-26 NOTE — ASSESSMENT & PLAN NOTE
NSTEMI due to demand ischemia in the setting of acute CHF exacerbation  Troponin has trended down  No EKG changes

## 2022-03-26 NOTE — HPI
Mr. Garrison is a 61-year-old male, noncompliance, diabetes, ETOH abuse who comes in to the ED with shortness of breath, abdominal pain.  Patient reports history of diverticulosis.  He stopped taking his medications 2 months ago.  Reports PND and orthopnea.  Denies lower extremity swelling.  He was initially admitted to the ICU for hypertensive emergency and placed on a Cardene drip.  Has since been transition to the floor.  BNP 2944. Troponin 0.144>0.125 Received IV Lasix for volume overload.  Placed on oral antihypertensives.  Echocardiogram with 30% EF, global hypokinesis.  Denies history of tobacco abuse, drug abuse.  EKG sinus tach, septal infarct  Denies family history of cardiac problems  Reports being a heavy drinker.  But, is now trying to cut back.

## 2022-03-26 NOTE — ASSESSMENT & PLAN NOTE
Newly diagnosed CHF, EF 30%, global hypokinesis  Reports being a heavy drinker, now trying to cut back  Continue GDMT for CHF.  Add ARB  Keep NPO at midnight for stress test in the a.m for ischemia evaluation

## 2022-03-26 NOTE — PLAN OF CARE
POC Reviewed  Patient aware of plan of care and verbalize understanding    Patient awake, alert and oriented   No acute distress noted, respirations even and unlabored.   Patient denies pain at this time.   PRN pain meds administered per MAR.  IV site patent and intact, no redness.  No other complaints voiced at this time.   Continue on telemetry monitoring.   Safety precautions remains in place.   Nutrition and hydration encouraged throughout  shift   Will continue to monitor.

## 2022-03-26 NOTE — SUBJECTIVE & OBJECTIVE
Interval History:   Echo resulted LVEF 30%, LV global hypokinesis , G1DD, mildly reduced RV systolic function. Cardiology consult obtained and suggested MPI stress test today for ischemic workup. ARB/ACEi not initiated due to bumped creatinine 1.7>1.4. Lasix held. Clinically appeared compensated. BNP down to 566>2884. BP control is favorable.     Review of Systems   Constitutional:  Negative for activity change, appetite change and fever.   HENT:  Negative for sore throat.    Eyes:  Negative for visual disturbance.   Respiratory:  Negative for cough, chest tightness and shortness of breath.    Cardiovascular:  Negative for chest pain, palpitations and leg swelling.   Gastrointestinal:  Positive for abdominal pain (resolved). Negative for abdominal distention, constipation, diarrhea, nausea and vomiting.   Endocrine: Negative for polyuria.   Genitourinary:  Negative for decreased urine volume, dysuria, flank pain, frequency and hematuria.   Musculoskeletal:  Negative for back pain and gait problem.   Skin:  Negative for rash.   Neurological:  Negative for syncope, speech difficulty, weakness, light-headedness and headaches.   Psychiatric/Behavioral:  Negative for confusion, hallucinations and sleep disturbance.    Objective:     Vital Signs (Most Recent):  Temp: 98 °F (36.7 °C) (03/26/22 1149)  Pulse: 68 (03/26/22 1405)  Resp: 18 (03/26/22 1149)  BP: (!) 125/92 (03/26/22 1405)  SpO2: 97 % (03/26/22 1149)   Vital Signs (24h Range):  Temp:  [97.6 °F (36.4 °C)-98.4 °F (36.9 °C)] 98 °F (36.7 °C)  Pulse:  [68-92] 68  Resp:  [16-18] 18  SpO2:  [93 %-98 %] 97 %  BP: (112-145)/() 125/92     Weight: 62.3 kg (137 lb 5.6 oz)  Body mass index is 23.58 kg/m².    Intake/Output Summary (Last 24 hours) at 3/26/2022 1512  Last data filed at 3/25/2022 1800  Gross per 24 hour   Intake 180 ml   Output --   Net 180 ml      Physical Exam  Constitutional:       General: He is not in acute distress.     Appearance: He is  well-developed. He is not diaphoretic.   HENT:      Head: Normocephalic and atraumatic.      Mouth/Throat:      Pharynx: No oropharyngeal exudate.   Eyes:      Conjunctiva/sclera: Conjunctivae normal.      Pupils: Pupils are equal, round, and reactive to light.   Neck:      Thyroid: No thyromegaly.      Vascular: No JVD.   Cardiovascular:      Rate and Rhythm: Normal rate and regular rhythm.      Heart sounds: Normal heart sounds. No murmur heard.  Pulmonary:      Effort: Pulmonary effort is normal. No respiratory distress.      Breath sounds: Normal breath sounds. No wheezing or rales.   Chest:      Chest wall: No tenderness.   Abdominal:      General: Bowel sounds are normal. There is no distension.      Palpations: Abdomen is soft.      Tenderness: There is no abdominal tenderness. There is no guarding or rebound.   Musculoskeletal:         General: Normal range of motion.      Cervical back: Normal range of motion and neck supple.   Lymphadenopathy:      Cervical: No cervical adenopathy.   Skin:     General: Skin is warm and dry.      Findings: No rash.   Neurological:      Mental Status: He is alert and oriented to person, place, and time.      Cranial Nerves: No cranial nerve deficit.      Sensory: No sensory deficit.      Deep Tendon Reflexes: Reflexes normal.       Significant Labs: All pertinent labs within the past 24 hours have been reviewed.  BMP:   Recent Labs   Lab 03/26/22  0445   *      K 4.2   CL 96   CO2 29   BUN 32*   CREATININE 1.7*   CALCIUM 9.1   MG 1.6     CBC:   Recent Labs   Lab 03/24/22  1715 03/25/22  0402 03/26/22  0445   WBC 4.54 5.61 5.94   HGB 16.7 13.1* 12.7*   HCT 50.8 39.5* 39.0*    271 256     CMP:   Recent Labs   Lab 03/24/22  1715 03/25/22  0402 03/26/22  0445    143 139   K 4.7 3.9 4.2    96 96   CO2 25 31* 29   GLU 79 95 124*   BUN 19 18 32*   CREATININE 1.4 1.3 1.7*   CALCIUM 9.7 10.2 9.1   PROT 8.0 7.5 7.2   ALBUMIN 3.9 3.8 3.4*   BILITOT 0.4  0.4 0.2   ALKPHOS 107 108 96   AST 65* 44* 34   ALT 48* 43 32   ANIONGAP 16 16 14   EGFRNONAA 54* 59* 43*     Cardiac Markers:   Recent Labs   Lab 03/26/22  0445   *       Significant Imaging:

## 2022-03-26 NOTE — ASSESSMENT & PLAN NOTE
-Continue Cardene drip  -Will resume previous home antihypertensives once Cardene weaned off  -IV lasix 40 mg BID  -Consult Cardiology for management   3/25-  -Cardene gtt weaned off  -Oral antihypertensives initiated and assess response   -Downgrade to telemetry   3/26-   -BP control is currently good  -Continue BB, Hydralazine and Imdur  -ARB/ ACEi not added due to YANG

## 2022-03-26 NOTE — PLAN OF CARE
NAEON. Patient rested throughout the night. BP WNL overnight. Tylenol given at start of shift for headache, full relief noted. Patient NPO past MN for stress test in AM. Patient given positive encouragement for continued management of BP. Goal setting encouraged.       Problem: Adult Inpatient Plan of Care  Goal: Plan of Care Review  Outcome: Ongoing, Progressing  Goal: Patient-Specific Goal (Individualized)  Outcome: Ongoing, Progressing  Goal: Absence of Hospital-Acquired Illness or Injury  Outcome: Ongoing, Progressing  Goal: Optimal Comfort and Wellbeing  Outcome: Ongoing, Progressing  Goal: Readiness for Transition of Care  Outcome: Ongoing, Progressing     Problem: Diabetes Comorbidity  Goal: Blood Glucose Level Within Targeted Range  Outcome: Ongoing, Progressing

## 2022-03-26 NOTE — SUBJECTIVE & OBJECTIVE
No past medical history on file.    No past surgical history on file.    Review of patient's allergies indicates:  No Known Allergies    No current facility-administered medications on file prior to encounter.     No current outpatient medications on file prior to encounter.     Family History    None       Tobacco Use    Smoking status: Not on file    Smokeless tobacco: Not on file   Substance and Sexual Activity    Alcohol use: Not on file    Drug use: Not on file    Sexual activity: Not on file     Review of Systems   Constitutional: Negative for diaphoresis, malaise/fatigue, weight gain and weight loss.   HENT:  Negative for congestion and nosebleeds.    Cardiovascular:  Positive for orthopnea and paroxysmal nocturnal dyspnea. Negative for chest pain, claudication, cyanosis, dyspnea on exertion, irregular heartbeat, leg swelling, near-syncope, palpitations and syncope.   Respiratory:  Positive for shortness of breath. Negative for cough, hemoptysis, sleep disturbances due to breathing, snoring, sputum production and wheezing.    Hematologic/Lymphatic: Negative for bleeding problem. Does not bruise/bleed easily.   Skin:  Negative for rash.   Musculoskeletal:  Negative for arthritis, back pain, falls, joint pain, muscle cramps and muscle weakness.   Gastrointestinal:  Negative for abdominal pain, constipation, diarrhea, heartburn, hematemesis, hematochezia, melena, nausea and vomiting.   Genitourinary:  Negative for dysuria, hematuria and nocturia.   Neurological:  Negative for excessive daytime sleepiness, dizziness, headaches, light-headedness, loss of balance, numbness, vertigo and weakness.   Objective:     Vital Signs (Most Recent):  Temp: 98.2 °F (36.8 °C) (Simultaneous filing. User may not have seen previous data.) (03/25/22 2001)  Pulse: 85 (03/25/22 2142)  Resp: 18 (03/25/22 2001)  BP: (!) 145/100 (03/25/22 2142)  SpO2: 96 % (03/25/22 2001)   Vital Signs (24h Range):  Temp:  [97.4 °F (36.3 °C)-98.2 °F  (36.8 °C)] 98.2 °F (36.8 °C)  Pulse:  [] 85  Resp:  [18-43] 18  SpO2:  [87 %-100 %] 96 %  BP: (111-145)/() 145/100     Weight: 58.5 kg (129 lb)  Body mass index is 22.14 kg/m².    SpO2: 96 %  O2 Device (Oxygen Therapy): room air      Intake/Output Summary (Last 24 hours) at 3/25/2022 2203  Last data filed at 3/25/2022 1800  Gross per 24 hour   Intake 182.09 ml   Output 2825 ml   Net -2642.91 ml       Lines/Drains/Airways       Peripheral Intravenous Line  Duration                  Peripheral IV - Single Lumen 03/24/22 1715 20 G Right Antecubital 1 day                    Physical Exam  Vitals and nursing note reviewed.   Constitutional:       Appearance: Normal appearance. He is well-developed.   HENT:      Head: Normocephalic.      Mouth/Throat:      Mouth: Mucous membranes are moist.   Neck:      Vascular: No carotid bruit or JVD.   Cardiovascular:      Rate and Rhythm: Normal rate and regular rhythm.      Pulses: Normal pulses.      Heart sounds: Normal heart sounds. No murmur heard.    No friction rub.   Pulmonary:      Effort: Pulmonary effort is normal. No respiratory distress.      Breath sounds: Normal breath sounds. No wheezing or rales.   Abdominal:      General: Bowel sounds are normal. There is no distension.      Palpations: Abdomen is soft.      Tenderness: There is no abdominal tenderness. There is no guarding.   Musculoskeletal:         General: No swelling or tenderness.      Cervical back: Neck supple. No tenderness.      Right lower leg: No edema.      Left lower leg: No edema.   Skin:     General: Skin is warm and dry.      Capillary Refill: Capillary refill takes less than 2 seconds.      Findings: No rash.   Neurological:      General: No focal deficit present.      Mental Status: He is alert and oriented to person, place, and time.   Psychiatric:         Mood and Affect: Mood normal.         Behavior: Behavior normal.         Thought Content: Thought content normal.        Significant Labs: BMP:   Recent Labs   Lab 03/24/22  1715 03/25/22  0402   GLU 79 95    143   K 4.7 3.9    96   CO2 25 31*   BUN 19 18   CREATININE 1.4 1.3   CALCIUM 9.7 10.2   , CMP   Recent Labs   Lab 03/24/22  1715 03/25/22  0402    143   K 4.7 3.9    96   CO2 25 31*   GLU 79 95   BUN 19 18   CREATININE 1.4 1.3   CALCIUM 9.7 10.2   PROT 8.0 7.5   ALBUMIN 3.9 3.8   BILITOT 0.4 0.4   ALKPHOS 107 108   AST 65* 44*   ALT 48* 43   ANIONGAP 16 16   ESTGFRAFRICA >60 >60   EGFRNONAA 54* 59*   , INR No results for input(s): INR, PROTIME in the last 48 hours., Lipid Panel   Recent Labs   Lab 03/24/22  2309   CHOL 232*   HDL 77*   LDLCALC 142.4   TRIG 63   CHOLHDL 33.2   , and Troponin   Recent Labs   Lab 03/24/22 1715 03/24/22  2309 03/25/22  0402   TROPONINI 0.144* 0.143* 0.125*       Significant Imaging: Echocardiogram: 2D echo with color flow doppler: No results found for this or any previous visit. and Transthoracic echo (TTE) complete (Cupid Only):   Results for orders placed or performed during the hospital encounter of 03/24/22   Echo   Result Value Ref Range    BSA 1.63 m2    TDI SEPTAL 0.03 m/s    LV LATERAL E/E' RATIO 16.25 m/s    LV SEPTAL E/E' RATIO 21.67 m/s    LA WIDTH 3.17 cm    IVC diameter 1.17 cm    Left Ventricular Outflow Tract Mean Velocity 0.18421030684091 cm/s    Left Ventricular Outflow Tract Mean Gradient 0.93 mmHg    TDI LATERAL 0.04 m/s    LVIDd 4.23 3.5 - 6.0 cm    IVS 1.69 (A) 0.6 - 1.1 cm    Posterior Wall 1.77 (A) 0.6 - 1.1 cm    Ao root annulus 2.85 cm    LVIDs 3.78 2.1 - 4.0 cm    FS 11 28 - 44 %    LA volume 25.30 cm3    Sinus 3.34 cm    STJ 2.85 cm    Ascending aorta 3.31 cm    LV mass 315.99 g    LA size 2.52 cm    TAPSE 1.32 cm    Left Ventricle Relative Wall Thickness 0.84 cm    AV mean gradient 2 mmHg    AV valve area 2.55 cm2    AV Velocity Ratio 0.63     AV index (prosthetic) 0.65     MV valve area p 1/2 method 3.93 cm2    E/A ratio 1.08     Mean e'  0.04 m/s    E wave deceleration time [id number] msec    LVOT diameter 2.24 cm    LVOT area 3.9 cm2    LVOT peak jason 0.60 m/s    LVOT peak VTI 9.90 cm    Ao peak jason 0.95 m/s    Ao VTI 15.3 cm    RVOT peak jason 0.62 m/s    RVOT peak VTI 9.8 cm    LVOT stroke volume 38.99 cm3    AV peak gradient 4 mmHg    PV mean gradient 0.90 mmHg    E/E' ratio 18.57 m/s    MV Peak E Jason 0.65 m/s    TR Max Jason 2.74 m/s    MV stenosis pressure 1/2 time [id number] ms    MV Peak A Jason 0.60 m/s    LV Systolic Volume 61.07 mL    LV Systolic Volume Index 37.7 mL/m2    LV Diastolic Volume 80.12 mL    LV Diastolic Volume Index 49.46 mL/m2    LA Volume Index 15.6 mL/m2    LV Mass Index 195 g/m2    Echo EF Estimated 24 %    RA Major Axis 4.68 cm    Left Atrium Minor Axis 3.17 cm    Left Atrium Major Axis 4.52 cm    Triscuspid Valve Regurgitation Peak Gradient 30 mmHg    RA Width 2.85 cm    Right Atrial Pressure (from IVC) 3 mmHg    EF 30 %    TV rest pulmonary artery pressure 33 mmHg    Narrative    · The left ventricle is normal in size with concentric hypertrophy and   moderately decreased systolic function.  · Grade I left ventricular diastolic dysfunction.  · The estimated PA systolic pressure is 33 mmHg.  · Normal right ventricular size with mildly reduced right ventricular   systolic function.  · Normal central venous pressure (3 mmHg).  · Trivial pericardial effusion.  · The estimated ejection fraction is 30%.  · There is left ventricular global hypokinesis.

## 2022-03-26 NOTE — ASSESSMENT & PLAN NOTE
- HgA1c 5.5   -It appears pt has not been on oral hypoglycemics and seems can manage his diabetes with diet only

## 2022-03-26 NOTE — ASSESSMENT & PLAN NOTE
- Troponin 0.144. CP free on admission.    -EKG with LVH by voltage criteria and non specific ST/T wave changes   - ASA, BB, and statin initiated   - Trend serial cardiac enzymes and EKG  -Will add heparin drip if troponin trends up  - Check FLP.  - Will obtain 2D echo  - Cardiology consult  3/25-  -Likely type 2 in the setting of uncontrolled HTN with Hypertensive emergency   -Await further input from Cardiology   3/26-  -Echo resulted LVEF 30%, LV global hypokinesis , G1DD, mildly reduced RV systolic function.No WMA reported.  - Cardiology consult obtained and suggested MPI stress test today for ischemic workup.   -ARB/ACEi not initiated due to bumped creatinine 1.7>1.4.   -Continue ASA, Statin , BB

## 2022-03-26 NOTE — PROGRESS NOTES
[facility] Medicine  Progress Note    Patient Name: Vipin Person Jr.  MRN: [id number]  Patient Class: IP- Inpatient   Admission Date: 3/24/2022  Length of Stay: 2 days  Attending Physician: Astrid Saez MD  Primary Care Provider: Primary Doctor No        Subjective:     Principal Problem:Hypertensive emergency        HPI:  Vipin Person Jr. is a 61-year-old male  male with a PMHx of HTN who presents to [facility] ED for evaluation of periumbilical abdominal pain which onset 1 week ago. States since he has a history of diverticulosis, he assumed the pain he was experiencing was related to that and simply planned for a PCP follow-up regarding the possible diverticulosis pain and refill of HTN medications on next Monday. However, two days ago he gradually started experiencing shortness of breath that significantly began worsening today and he decided he could not wait until Monday to see the PCP and came to the ED right away. Reports he has been out of his HTN medication for over two months while he had been away in [place]. Patient denies fever, H/A, nausea, vomiting, chest pain, chills, diarrhea, or constipation. CT of Abdomen/Pelvis which showed trace bilateral pleural effusions and probable pulmonary edema. Correlation for volume overload. Few scattered subsolid opacities possibly related to edema but viral pneumonitis not excluded. No evidence of diverticulitis or other acute abdominal abnormality. EKG showed sinus tachycardia. Labs included BNP 2,844, Troponin 0.144. He is a Full Code status and his surrogate decision maker is his mother, Breonna Person at [phone]. He is admitted to Hospital Medicine and placed in ICU for close monitoring.       Overview/Hospital Course:  Admitted to ICU on Cardene gtt for Hypertensive emergency . BP was 180/130 on admit associated with pulmonary edema , elevated BNP and elevated troponin. Pt started on ASA, statin, BB and IV lasix..  Cardiology was consulted . Additionally pt had periumbilical abdominal pain on presentation which has resolved after administration of 6 mg Morphine IV in the ED. CT abd /plevis with no intraabdominal acute process.     3/25- Pt is seen at bedside in ICU. Fully awake , alert, Ox3. Off Cardene gtt. Admits to not taking home BP meds for at least 2 mos. Denies chest pain. SOB has improved and denies further abd pain. Denies constipation. Troponin elevation is flat, peaked at 0.144 down to 0.125. Oral antihypertensives include BB, Hydralazine and Imdur initiated . Creatinine 1.3. Metabolic alkalosis is noted. Diuretic transitioned to oral Furosemide. Await Cardiology input. Downgrade to telemetry. Echo in progress.     3/26- AAOx 3. Reports feeling better overall. No further abd pain, SOB or chest pain. Echo resulted LVEF 30%, LV global hypokinesis , G1DD, mildly reduced RV systolic function. Cardiology consult obtained and suggested MPI stress test today for ischemic workup. ARB/ACEi not initiated due to bumped creatinine 1.7>1.4. Lasix held. Clinically appeared compensated. BNP down to 566>2884. BP control is favorable.       Interval History:   Echo resulted LVEF 30%, LV global hypokinesis , G1DD, mildly reduced RV systolic function. Cardiology consult obtained and suggested MPI stress test today for ischemic workup. ARB/ACEi not initiated due to bumped creatinine 1.7>1.4. Lasix held. Clinically appeared compensated. BNP down to 566>2884. BP control is favorable.     Review of Systems   Constitutional:  Negative for activity change, appetite change and fever.   HENT:  Negative for sore throat.    Eyes:  Negative for visual disturbance.   Respiratory:  Negative for cough, chest tightness and shortness of breath.    Cardiovascular:  Negative for chest pain, palpitations and leg swelling.   Gastrointestinal:  Positive for abdominal pain (resolved). Negative for abdominal distention, constipation, diarrhea, nausea and  vomiting.   Endocrine: Negative for polyuria.   Genitourinary:  Negative for decreased urine volume, dysuria, flank pain, frequency and hematuria.   Musculoskeletal:  Negative for back pain and gait problem.   Skin:  Negative for rash.   Neurological:  Negative for syncope, speech difficulty, weakness, light-headedness and headaches.   Psychiatric/Behavioral:  Negative for confusion, hallucinations and sleep disturbance.    Objective:     Vital Signs (Most Recent):  Temp: 98 °F (36.7 °C) (03/26/22 1149)  Pulse: 68 (03/26/22 1405)  Resp: 18 (03/26/22 1149)  BP: (!) 125/92 (03/26/22 1405)  SpO2: 97 % (03/26/22 1149)   Vital Signs (24h Range):  Temp:  [97.6 °F (36.4 °C)-98.4 °F (36.9 °C)] 98 °F (36.7 °C)  Pulse:  [68-92] 68  Resp:  [16-18] 18  SpO2:  [93 %-98 %] 97 %  BP: (112-145)/() 125/92     Weight: 62.3 kg (137 lb 5.6 oz)  Body mass index is 23.58 kg/m².    Intake/Output Summary (Last 24 hours) at 3/26/2022 1512  Last data filed at 3/25/2022 1800  Gross per 24 hour   Intake 180 ml   Output --   Net 180 ml      Physical Exam  Constitutional:       General: He is not in acute distress.     Appearance: He is well-developed. He is not diaphoretic.   HENT:      Head: Normocephalic and atraumatic.      Mouth/Throat:      Pharynx: No oropharyngeal exudate.   Eyes:      Conjunctiva/sclera: Conjunctivae normal.      Pupils: Pupils are equal, round, and reactive to light.   Neck:      Thyroid: No thyromegaly.      Vascular: No JVD.   Cardiovascular:      Rate and Rhythm: Normal rate and regular rhythm.      Heart sounds: Normal heart sounds. No murmur heard.  Pulmonary:      Effort: Pulmonary effort is normal. No respiratory distress.      Breath sounds: Normal breath sounds. No wheezing or rales.   Chest:      Chest wall: No tenderness.   Abdominal:      General: Bowel sounds are normal. There is no distension.      Palpations: Abdomen is soft.      Tenderness: There is no abdominal tenderness. There is no guarding  or rebound.   Musculoskeletal:         General: Normal range of motion.      Cervical back: Normal range of motion and neck supple.   Lymphadenopathy:      Cervical: No cervical adenopathy.   Skin:     General: Skin is warm and dry.      Findings: No rash.   Neurological:      Mental Status: He is alert and oriented to person, place, and time.      Cranial Nerves: No cranial nerve deficit.      Sensory: No sensory deficit.      Deep Tendon Reflexes: Reflexes normal.       Significant Labs: All pertinent labs within the past 24 hours have been reviewed.  BMP:   Recent Labs   Lab 03/26/22  0445   *      K 4.2   CL 96   CO2 29   BUN 32*   CREATININE 1.7*   CALCIUM 9.1   MG 1.6     CBC:   Recent Labs   Lab 03/24/22  1715 03/25/22  0402 03/26/22  0445   WBC 4.54 5.61 5.94   HGB 16.7 13.1* 12.7*   HCT 50.8 39.5* 39.0*    271 256     CMP:   Recent Labs   Lab 03/24/22 1715 03/25/22  0402 03/26/22  0445    143 139   K 4.7 3.9 4.2    96 96   CO2 25 31* 29   GLU 79 95 124*   BUN 19 18 32*   CREATININE 1.4 1.3 1.7*   CALCIUM 9.7 10.2 9.1   PROT 8.0 7.5 7.2   ALBUMIN 3.9 3.8 3.4*   BILITOT 0.4 0.4 0.2   ALKPHOS 107 108 96   AST 65* 44* 34   ALT 48* 43 32   ANIONGAP 16 16 14   EGFRNONAA 54* 59* 43*     Cardiac Markers:   Recent Labs   Lab 03/26/22  0445   *       Significant Imaging:       Assessment/Plan:      * Hypertensive emergency  -Continue Cardene drip  -Will resume previous home antihypertensives once Cardene weaned off  -IV lasix 40 mg BID  -Consult Cardiology for management   3/25-  -Cardene gtt weaned off  -Oral antihypertensives initiated and assess response   -Downgrade to telemetry   3/26-   -BP control is currently good  -Continue BB, Hydralazine and Imdur  -ARB/ ACEi not added due to YANG      Acute Decompensated heart failure, new onset   -EF unknown  -Will obtain 2D Echo   -See medical management above   3/25-  -Echo in progress   -Continue BB  -Will start ACEi/ARB once  renal function permit   3/26-  -Echo resulted LVEF 30%, LV global hypokinesis , G1DD, mildly reduced RV systolic function.  - Cardiology consult obtained and suggested MPI stress test today for ischemic workup.   -ARB/ACEi not initiated due to bumped creatinine 1.7>1.4.   -Lasix held  -Continue BB    NSTEMI (non-ST elevated myocardial infarction)  - Troponin 0.144. CP free on admission.    -EKG with LVH by voltage criteria and non specific ST/T wave changes   - ASA, BB, and statin initiated   - Trend serial cardiac enzymes and EKG  -Will add heparin drip if troponin trends up  - Check FLP.  - Will obtain 2D echo  - Cardiology consult  3/25-  -Likely type 2 in the setting of uncontrolled HTN with Hypertensive emergency   -Await further input from Cardiology   3/26-  -Echo resulted LVEF 30%, LV global hypokinesis , G1DD, mildly reduced RV systolic function.No WMA reported.  - Cardiology consult obtained and suggested MPI stress test today for ischemic workup.   -ARB/ACEi not initiated due to bumped creatinine 1.7>1.4.   -Continue ASA, Statin , BB    Renal insufficiency  - YANG vs CKD  -Baseline creatinine is unknown . Was 1.0 in 2015 per [facility]   -Monitor creatine trend and urine output  -Avoid nephrotoxic agents   -No obstructive uropathy.  No hydronephrosis per Ct abd/pelvis   3/26-  -Creatinine increased further   -Lasix held as pt clinically appears compensated   -ACEi or ARB not initiated   -Monitor renal indices     Periumbilical abdominal pain, unspecified   - Currently improved and denies further pain  -Tolerating diet   -CT abd/pelvis with no acute intraabdominal process   3/26-  -Resolved       Type 2 diabetes mellitus without complication, without long-term current use of insulin  - HgA1c 5.5   -It appears pt has not been on oral hypoglycemics and seems can manage his diabetes with diet only       Alcohol abuse, episodic drinking behavior  - Pt reports episodic excessive alcohol use , consume beer  and hard liquor on weekend and on special occasions and gets drunk . Denies dependency         VTE Risk Mitigation (From admission, onward)         Ordered     enoxaparin injection 40 mg  Daily         03/24/22 2258     IP VTE HIGH RISK PATIENT  Once         03/24/22 2258     Place sequential compression device  Until discontinued         03/24/22 2258                Discharge Planning   KANDI:      Code Status: Full Code   Is the patient medically ready for discharge?:     Reason for patient still in hospital (select all that apply): Patient trending condition  Discharge Plan A: Home with family                  Astrid Saez MD  Department of Hospital Medicine   [facility] - Telemetry ([facility])

## 2022-03-27 VITALS
RESPIRATION RATE: 18 BRPM | DIASTOLIC BLOOD PRESSURE: 90 MMHG | HEIGHT: 64 IN | BODY MASS INDEX: 23.68 KG/M2 | OXYGEN SATURATION: 98 % | HEART RATE: 81 BPM | SYSTOLIC BLOOD PRESSURE: 132 MMHG | TEMPERATURE: 98 F | WEIGHT: 138.69 LBS

## 2022-03-27 LAB
ANION GAP SERPL CALC-SCNC: 11 MMOL/L (ref 8–16)
BASOPHILS # BLD AUTO: 0.05 K/UL (ref 0–0.2)
BASOPHILS NFR BLD: 0.9 % (ref 0–1.9)
BUN SERPL-MCNC: 29 MG/DL (ref 8–23)
CALCIUM SERPL-MCNC: 8.9 MG/DL (ref 8.7–10.5)
CHLORIDE SERPL-SCNC: 102 MMOL/L (ref 95–110)
CO2 SERPL-SCNC: 27 MMOL/L (ref 23–29)
CREAT SERPL-MCNC: 1.5 MG/DL (ref 0.5–1.4)
DIFFERENTIAL METHOD: ABNORMAL
EOSINOPHIL # BLD AUTO: 0.3 K/UL (ref 0–0.5)
EOSINOPHIL NFR BLD: 4.5 % (ref 0–8)
ERYTHROCYTE [DISTWIDTH] IN BLOOD BY AUTOMATED COUNT: 15.6 % (ref 11.5–14.5)
EST. GFR  (AFRICAN AMERICAN): 57 ML/MIN/1.73 M^2
EST. GFR  (NON AFRICAN AMERICAN): 50 ML/MIN/1.73 M^2
GLUCOSE SERPL-MCNC: 71 MG/DL (ref 70–110)
HCT VFR BLD AUTO: 36.2 % (ref 40–54)
HGB BLD-MCNC: 11.5 G/DL (ref 14–18)
IMM GRANULOCYTES # BLD AUTO: 0.01 K/UL (ref 0–0.04)
IMM GRANULOCYTES NFR BLD AUTO: 0.2 % (ref 0–0.5)
LYMPHOCYTES # BLD AUTO: 2.1 K/UL (ref 1–4.8)
LYMPHOCYTES NFR BLD: 38.4 % (ref 18–48)
MCH RBC QN AUTO: 29.3 PG (ref 27–31)
MCHC RBC AUTO-ENTMCNC: 31.8 G/DL (ref 32–36)
MCV RBC AUTO: 92 FL (ref 82–98)
MONOCYTES # BLD AUTO: 0.7 K/UL (ref 0.3–1)
MONOCYTES NFR BLD: 12.1 % (ref 4–15)
NEUTROPHILS # BLD AUTO: 2.4 K/UL (ref 1.8–7.7)
NEUTROPHILS NFR BLD: 43.9 % (ref 38–73)
NRBC BLD-RTO: 0 /100 WBC
PLATELET # BLD AUTO: 247 K/UL (ref 150–450)
PMV BLD AUTO: 10.7 FL (ref 9.2–12.9)
POTASSIUM SERPL-SCNC: 4.6 MMOL/L (ref 3.5–5.1)
RBC # BLD AUTO: 3.92 M/UL (ref 4.6–6.2)
SODIUM SERPL-SCNC: 140 MMOL/L (ref 136–145)
WBC # BLD AUTO: 5.54 K/UL (ref 3.9–12.7)

## 2022-03-27 PROCEDURE — 85025 COMPLETE CBC W/AUTO DIFF WBC: CPT | Performed by: NURSE PRACTITIONER

## 2022-03-27 PROCEDURE — 80048 BASIC METABOLIC PNL TOTAL CA: CPT | Performed by: INTERNAL MEDICINE

## 2022-03-27 PROCEDURE — 94761 N-INVAS EAR/PLS OXIMETRY MLT: CPT

## 2022-03-27 PROCEDURE — 25000003 PHARM REV CODE 250: Performed by: INTERNAL MEDICINE

## 2022-03-27 PROCEDURE — 25000003 PHARM REV CODE 250: Performed by: NURSE PRACTITIONER

## 2022-03-27 PROCEDURE — 36415 COLL VENOUS BLD VENIPUNCTURE: CPT | Performed by: INTERNAL MEDICINE

## 2022-03-27 RX ORDER — ASPIRIN 81 MG/1
81 TABLET ORAL DAILY
Qty: 30 TABLET | Refills: 0 | Status: SHIPPED | OUTPATIENT
Start: 2022-03-27 | End: 2022-04-26

## 2022-03-27 RX ORDER — LOSARTAN POTASSIUM 25 MG/1
25 TABLET ORAL DAILY
Status: DISCONTINUED | OUTPATIENT
Start: 2022-03-27 | End: 2022-03-27 | Stop reason: HOSPADM

## 2022-03-27 RX ORDER — LOSARTAN POTASSIUM 50 MG/1
50 TABLET ORAL DAILY
Status: DISCONTINUED | OUTPATIENT
Start: 2022-03-27 | End: 2022-03-27

## 2022-03-27 RX ORDER — LOSARTAN POTASSIUM 25 MG/1
25 TABLET ORAL DAILY
Qty: 30 TABLET | Refills: 0 | Status: SHIPPED | OUTPATIENT
Start: 2022-03-28 | End: 2022-04-19 | Stop reason: ALTCHOICE

## 2022-03-27 RX ORDER — FUROSEMIDE 20 MG/1
20 TABLET ORAL DAILY
Qty: 30 TABLET | Refills: 0 | Status: SHIPPED | OUTPATIENT
Start: 2022-03-27 | End: 2022-04-26

## 2022-03-27 RX ORDER — METOPROLOL SUCCINATE 50 MG/1
50 TABLET, EXTENDED RELEASE ORAL DAILY
Status: DISCONTINUED | OUTPATIENT
Start: 2022-03-27 | End: 2022-03-27 | Stop reason: HOSPADM

## 2022-03-27 RX ORDER — ATORVASTATIN CALCIUM 40 MG/1
40 TABLET, FILM COATED ORAL NIGHTLY
Qty: 30 TABLET | Refills: 0 | Status: SHIPPED | OUTPATIENT
Start: 2022-03-27 | End: 2022-04-26

## 2022-03-27 RX ORDER — ACETAMINOPHEN 325 MG/1
650 TABLET ORAL EVERY 4 HOURS PRN
Refills: 0 | COMMUNITY
Start: 2022-03-27

## 2022-03-27 RX ORDER — METOPROLOL SUCCINATE 50 MG/1
50 TABLET, EXTENDED RELEASE ORAL DAILY
Qty: 30 TABLET | Refills: 0 | Status: SHIPPED | OUTPATIENT
Start: 2022-03-28 | End: 2022-04-27

## 2022-03-27 RX ADMIN — MAGNESIUM OXIDE TAB 400 MG (241.3 MG ELEMENTAL MG) 400 MG: 400 (241.3 MG) TAB at 08:03

## 2022-03-27 RX ADMIN — ASPIRIN 81 MG: 81 TABLET, COATED ORAL at 08:03

## 2022-03-27 RX ADMIN — METOPROLOL SUCCINATE 50 MG: 50 TABLET, EXTENDED RELEASE ORAL at 08:03

## 2022-03-27 RX ADMIN — Medication 100 MG: at 09:03

## 2022-03-27 RX ADMIN — ACETAMINOPHEN 650 MG: 325 TABLET ORAL at 08:03

## 2022-03-27 RX ADMIN — LOSARTAN POTASSIUM 25 MG: 25 TABLET, FILM COATED ORAL at 08:03

## 2022-03-27 RX ADMIN — ATORVASTATIN CALCIUM 40 MG: 40 TABLET, FILM COATED ORAL at 08:03

## 2022-03-27 RX ADMIN — MUPIROCIN: 20 OINTMENT TOPICAL at 08:03

## 2022-03-27 RX ADMIN — FOLIC ACID 1 MG: 1 TABLET ORAL at 08:03

## 2022-03-27 NOTE — PLAN OF CARE
[facility] - Telemetry (Hospital)  Discharge Final Note    Primary Care Provider: Primary Doctor No    Expected Discharge Date: 3/27/2022    Final Discharge Note (most recent)     Final Note - 03/27/22 1028        Final Note    Anticipated Discharge Disposition Home or Self Care                 Important Message from Medicare             Contact Info     No, Primary Doctor   Relationship: PCP - General        Next Steps: Schedule an appointment as soon as possible for a visit    Instructions: Have pt follow up with PCP in 3 days    Nelida Storey MD   Specialty: Cardiology    [address]  [place] [address]   Phone: [phone]       Next Steps: Schedule an appointment as soon as possible for a visit in 1 week(s)    Instructions: Cardiology follow up

## 2022-03-27 NOTE — ASSESSMENT & PLAN NOTE
Newly diagnosed CHF, EF 30%, global hypokinesis  Reports being a heavy drinker, now trying to cut back  Continue GDMT for CHF.  Add ARB  Keep NPO at midnight for stress test in the a.m for ischemia evaluation    3/26/22  Stress test with anterior apical scar  Start PO lasix once YANG improves  Discussed ETOH cessation, fluid restriction, daily weights, salt restriction  Will follow-up in CHF clinic

## 2022-03-27 NOTE — PLAN OF CARE
SANDIE. Patient rested overnight. BP well managed. Positive reinforcement given and goal setting for self care. Continue current plan of care.   Problem: Adult Inpatient Plan of Care  Goal: Plan of Care Review  Outcome: Ongoing, Progressing  Goal: Patient-Specific Goal (Individualized)  Outcome: Ongoing, Progressing  Goal: Absence of Hospital-Acquired Illness or Injury  Outcome: Ongoing, Progressing  Goal: Optimal Comfort and Wellbeing  Outcome: Ongoing, Progressing  Goal: Readiness for Transition of Care  Outcome: Ongoing, Progressing     Problem: Diabetes Comorbidity  Goal: Blood Glucose Level Within Targeted Range  Outcome: Ongoing, Progressing     Problem: Fluid and Electrolyte Imbalance (Acute Kidney Injury/Impairment)  Goal: Fluid and Electrolyte Balance  Outcome: Ongoing, Progressing     Problem: Oral Intake Inadequate (Acute Kidney Injury/Impairment)  Goal: Optimal Nutrition Intake  Outcome: Ongoing, Progressing     Problem: Renal Function Impairment (Acute Kidney Injury/Impairment)  Goal: Effective Renal Function  Outcome: Ongoing, Progressing

## 2022-03-27 NOTE — SUBJECTIVE & OBJECTIVE
Review of Systems   Constitutional: Negative.   HENT: Negative.     Eyes: Negative.    Cardiovascular: Negative.    Respiratory: Negative.     Endocrine: Negative.    Skin: Negative.    Musculoskeletal: Negative.    Gastrointestinal: Negative.    Genitourinary: Negative.    Neurological: Negative.    Psychiatric/Behavioral: Negative.     All other systems reviewed and are negative.  Objective:     Vital Signs (Most Recent):  Temp: 98.8 °F (37.1 °C) (03/26/22 1926)  Pulse: 89 (03/26/22 2042)  Resp: 16 (03/26/22 1926)  BP: (!) 128/91 (03/26/22 2042)  SpO2: 96 % (03/26/22 2034)   Vital Signs (24h Range):  Temp:  [97.6 °F (36.4 °C)-98.8 °F (37.1 °C)] 98.8 °F (37.1 °C)  Pulse:  [68-92] 89  Resp:  [16-18] 16  SpO2:  [93 %-98 %] 96 %  BP: (112-134)/(75-93) 128/91     Weight: 62.3 kg (137 lb 5.6 oz)  Body mass index is 23.58 kg/m².     SpO2: 96 %  O2 Device (Oxygen Therapy): room air      Intake/Output Summary (Last 24 hours) at 3/26/2022 2323  Last data filed at 3/26/2022 1700  Gross per 24 hour   Intake --   Output 4 ml   Net -4 ml       Lines/Drains/Airways       Peripheral Intravenous Line  Duration                  Peripheral IV - Single Lumen 03/24/22 1715 20 G Right Antecubital 2 days                    Physical Exam  Vitals and nursing note reviewed.   Constitutional:       Appearance: Normal appearance. He is well-developed.   HENT:      Head: Normocephalic.      Mouth/Throat:      Mouth: Mucous membranes are moist.   Neck:      Vascular: No carotid bruit or JVD.   Cardiovascular:      Rate and Rhythm: Normal rate and regular rhythm.      Pulses: Normal pulses.      Heart sounds: Normal heart sounds. No murmur heard.    No friction rub.   Pulmonary:      Effort: Pulmonary effort is normal. No respiratory distress.      Breath sounds: Normal breath sounds. No wheezing or rales.   Abdominal:      General: Bowel sounds are normal. There is no distension.      Palpations: Abdomen is soft.      Tenderness: There is no  abdominal tenderness. There is no guarding.   Musculoskeletal:         General: No swelling or tenderness.      Cervical back: Neck supple. No tenderness.      Right lower leg: No edema.      Left lower leg: No edema.   Skin:     General: Skin is warm and dry.      Capillary Refill: Capillary refill takes less than 2 seconds.      Findings: No rash.   Neurological:      General: No focal deficit present.      Mental Status: He is alert and oriented to person, place, and time.   Psychiatric:         Mood and Affect: Mood normal.         Behavior: Behavior normal.         Thought Content: Thought content normal.       Significant Labs: BMP:   Recent Labs   Lab 03/25/22  0402 03/26/22  0445   GLU 95 124*    139   K 3.9 4.2   CL 96 96   CO2 31* 29   BUN 18 32*   CREATININE 1.3 1.7*   CALCIUM 10.2 9.1   MG  --  1.6   , CMP   Recent Labs   Lab 03/25/22  0402 03/26/22  0445    139   K 3.9 4.2   CL 96 96   CO2 31* 29   GLU 95 124*   BUN 18 32*   CREATININE 1.3 1.7*   CALCIUM 10.2 9.1   PROT 7.5 7.2   ALBUMIN 3.8 3.4*   BILITOT 0.4 0.2   ALKPHOS 108 96   AST 44* 34   ALT 43 32   ANIONGAP 16 14   ESTGFRAFRICA >60 49*   EGFRNONAA 59* 43*   , CBC   Recent Labs   Lab 03/25/22  0402 03/26/22 0445   WBC 5.61 5.94   HGB 13.1* 12.7*   HCT 39.5* 39.0*    256   , INR No results for input(s): INR, PROTIME in the last 48 hours., and Lipid Panel No results for input(s): CHOL, HDL, LDLCALC, TRIG, CHOLHDL in the last 48 hours.    Significant Imaging: Echocardiogram: 2D echo with color flow doppler: No results found for this or any previous visit. and Transthoracic echo (TTE) complete ([facility] Only):   Results for orders placed or performed during the hospital encounter of 03/24/22   Echo   Result Value Ref Range    BSA 1.63 m2    TDI SEPTAL 0.03 m/s    LV LATERAL E/E' RATIO 16.25 m/s    LV SEPTAL E/E' RATIO 21.67 m/s    LA WIDTH 3.17 cm    IVC diameter 1.17 cm    Left Ventricular Outflow Tract Mean Velocity  0.37786135077506 cm/s    Left Ventricular Outflow Tract Mean Gradient 0.93 mmHg    TDI LATERAL 0.04 m/s    LVIDd 4.23 3.5 - 6.0 cm    IVS 1.69 (A) 0.6 - 1.1 cm    Posterior Wall 1.77 (A) 0.6 - 1.1 cm    Ao root annulus 2.85 cm    LVIDs 3.78 2.1 - 4.0 cm    FS 11 28 - 44 %    LA volume 25.30 cm3    Sinus 3.34 cm    STJ 2.85 cm    Ascending aorta 3.31 cm    LV mass 315.99 g    LA size 2.52 cm    TAPSE 1.32 cm    Left Ventricle Relative Wall Thickness 0.84 cm    AV mean gradient 2 mmHg    AV valve area 2.55 cm2    AV Velocity Ratio 0.63     AV index (prosthetic) 0.65     MV valve area p 1/2 method 3.93 cm2    E/A ratio 1.08     Mean e' 0.04 m/s    E wave deceleration time [id number] msec    LVOT diameter 2.24 cm    LVOT area 3.9 cm2    LVOT peak jason 0.60 m/s    LVOT peak VTI 9.90 cm    Ao peak jason 0.95 m/s    Ao VTI 15.3 cm    RVOT peak jason 0.62 m/s    RVOT peak VTI 9.8 cm    LVOT stroke volume 38.99 cm3    AV peak gradient 4 mmHg    PV mean gradient 0.90 mmHg    E/E' ratio 18.57 m/s    MV Peak E Jason 0.65 m/s    TR Max Jason 2.74 m/s    MV stenosis pressure 1/2 time [id number] ms    MV Peak A Jason 0.60 m/s    LV Systolic Volume 61.07 mL    LV Systolic Volume Index 37.7 mL/m2    LV Diastolic Volume 80.12 mL    LV Diastolic Volume Index 49.46 mL/m2    LA Volume Index 15.6 mL/m2    LV Mass Index 195 g/m2    Echo EF Estimated 24 %    RA Major Axis 4.68 cm    Left Atrium Minor Axis 3.17 cm    Left Atrium Major Axis 4.52 cm    Triscuspid Valve Regurgitation Peak Gradient 30 mmHg    RA Width 2.85 cm    Right Atrial Pressure (from IVC) 3 mmHg    EF 30 %    TV rest pulmonary artery pressure 33 mmHg    Narrative    · The left ventricle is normal in size with concentric hypertrophy and   moderately decreased systolic function.  · Grade I left ventricular diastolic dysfunction.  · The estimated PA systolic pressure is 33 mmHg.  · Normal right ventricular size with mildly reduced right ventricular   systolic  function.  · Normal central venous pressure (3 mmHg).  · Trivial pericardial effusion.  · The estimated ejection fraction is 30%.  · There is left ventricular global hypokinesis.

## 2022-03-27 NOTE — PROGRESS NOTES
[facility] - Telemetry ([facility])  Cardiology  Progress Note    Patient Name: Vipin Person Jr.  MRN: [id number]  Admission Date: 3/24/2022  Hospital Length of Stay: 2 days  Code Status: Full Code   Attending Physician: Astrid Saez MD   Primary Care Physician: Primary Doctor No  Expected Discharge Date:   Principal Problem:Hypertensive emergency    Subjective:     Hospital Course:   Mr. Garrison is a 61-year-old male, noncompliance, diabetes, ETOH abuse who comes in to the ED with shortness of breath, abdominal pain.  Patient reports history of diverticulosis.  He stopped taking his medications 2 months ago.  Reports PND and orthopnea.  Denies lower extremity swelling.  He was initially admitted to the ICU for hypertensive emergency and placed on a Cardene drip.  Has since been transition to the floor.  BNP 2944. Troponin 0.144>0.125 Received IV Lasix for volume overload.  Placed on oral antihypertensives.  Echocardiogram with 30% EF, global hypokinesis.  Denies history of tobacco abuse, drug abuse.  EKG sinus tach, septal infarct  Denies family history of cardiac problems  Reports being a heavy drinker.  But, is now trying to cut back.        3/26/2022  Stress test performed today showed anterior apical scar.  Patient doing well otherwise.  Denies shortness of breath or chest pain.      No new subjective & objective note has been filed under this hospital service since the last note was generated.    Assessment and Plan:         * Hypertensive emergency  Continue Toprol XL, hydralazine, Isordil  Recommending adding ARB for CHF    3/26/22  Mild YANG today  Would not start ARB  Once discharged, will need repeat labs  Continue Toprol XL, hydralazine, Isordil    YANG (acute kidney injury)  Due to over diuresis   Will improve with holding lasix    Type 2 diabetes mellitus without complication, without long-term current use of insulin  Reports history of diabetes  A1c 5.5    Periumbilical abdominal pain, unspecified   Management  per primary team    Acute Decompensated heart failure, new onset   Newly diagnosed CHF, EF 30%, global hypokinesis  Reports being a heavy drinker, now trying to cut back  Continue GDMT for CHF.  Add ARB  Keep NPO at midnight for stress test in the a.m for ischemia evaluation    3/26/22  Stress test with anterior apical scar  Start PO lasix once YANG improves  Discussed ETOH cessation, fluid restriction, daily weights, salt restriction  Will follow-up in CHF clinic      NSTEMI (non-ST elevated myocardial infarction)  NSTEMI due to demand ischemia in the setting of acute CHF exacerbation  Troponin has trended down  No EKG changes    3/26/22  Stress test with anterior apical scar, no evidence of ischemia  Continue GDMT for CHF        Will sign off. Please call with any questions or concerns.     VTE Risk Mitigation (From admission, onward)         Ordered     enoxaparin injection 40 mg  Daily         03/24/22 2258     IP VTE HIGH RISK PATIENT  Once         03/24/22 2258     Place sequential compression device  Until discontinued         03/24/22 2258                Nelida Storey MD  Cardiology  [facility] - Telemetry ([facility])

## 2022-03-27 NOTE — ASSESSMENT & PLAN NOTE
Continue Toprol XL, hydralazine, Isordil  Recommending adding ARB for CHF    3/26/22  Mild YANG today  Would not start ARB  Once discharged, will need repeat labs  Continue Toprol XL, hydralazine, Isordil

## 2022-03-27 NOTE — PROGRESS NOTES
[facility])  Cardiology  Progress Note    Patient Name: Vipin Person Jr.  MRN: [id number]  Admission Date: 3/24/2022  Hospital Length of Stay: 2 days  Code Status: Full Code   Attending Physician: Astrid Saez MD   Primary Care Physician: Primary Doctor No  Expected Discharge Date:   Principal Problem:Hypertensive emergency    Subjective:     Hospital Course:   Mr. Garrison is a 61-year-old male, noncompliance, diabetes, ETOH abuse who comes in to the ED with shortness of breath, abdominal pain.  Patient reports history of diverticulosis.  He stopped taking his medications 2 months ago.  Reports PND and orthopnea.  Denies lower extremity swelling.  He was initially admitted to the ICU for hypertensive emergency and placed on a Cardene drip.  Has since been transition to the floor.  BNP 2944. Troponin 0.144>0.125 Received IV Lasix for volume overload.  Placed on oral antihypertensives.  Echocardiogram with 30% EF, global hypokinesis.  Denies history of tobacco abuse, drug abuse.  EKG sinus tach, septal infarct  Denies family history of cardiac problems  Reports being a heavy drinker.  But, is now trying to cut back.        3/26/2022  Stress test performed today showed anterior apical scar.  Patient doing well otherwise.  Denies shortness of breath or chest pain.        Review of Systems   Constitutional: Negative.   HENT: Negative.     Eyes: Negative.    Cardiovascular: Negative.    Respiratory: Negative.     Endocrine: Negative.    Skin: Negative.    Musculoskeletal: Negative.    Gastrointestinal: Negative.    Genitourinary: Negative.    Neurological: Negative.    Psychiatric/Behavioral: Negative.     All other systems reviewed and are negative.  Objective:     Vital Signs (Most Recent):  Temp: 98.8 °F (37.1 °C) (03/26/22 1926)  Pulse: 89 (03/26/22 2042)  Resp: 16 (03/26/22 1926)  BP: (!) 128/91 (03/26/22 2042)  SpO2: 96 % (03/26/22 2034)   Vital Signs (24h Range):  Temp:  [97.6 °F (36.4 °C)-98.8 °F  (37.1 °C)] 98.8 °F (37.1 °C)  Pulse:  [68-92] 89  Resp:  [16-18] 16  SpO2:  [93 %-98 %] 96 %  BP: (112-134)/(75-93) 128/91     Weight: 62.3 kg (137 lb 5.6 oz)  Body mass index is 23.58 kg/m².     SpO2: 96 %  O2 Device (Oxygen Therapy): room air      Intake/Output Summary (Last 24 hours) at 3/26/2022 2325  Last data filed at 3/26/2022 1700  Gross per 24 hour   Intake --   Output 4 ml   Net -4 ml       Lines/Drains/Airways       Peripheral Intravenous Line  Duration                  Peripheral IV - Single Lumen 03/24/22 1715 20 G Right Antecubital 2 days                    Physical Exam  Vitals and nursing note reviewed.   Constitutional:       Appearance: Normal appearance. He is well-developed.   HENT:      Head: Normocephalic.      Mouth/Throat:      Mouth: Mucous membranes are moist.   Neck:      Vascular: No carotid bruit or JVD.   Cardiovascular:      Rate and Rhythm: Normal rate and regular rhythm.      Pulses: Normal pulses.      Heart sounds: Normal heart sounds. No murmur heard.    No friction rub.   Pulmonary:      Effort: Pulmonary effort is normal. No respiratory distress.      Breath sounds: Normal breath sounds. No wheezing or rales.   Abdominal:      General: Bowel sounds are normal. There is no distension.      Palpations: Abdomen is soft.      Tenderness: There is no abdominal tenderness. There is no guarding.   Musculoskeletal:         General: No swelling or tenderness.      Cervical back: Neck supple. No tenderness.      Right lower leg: No edema.      Left lower leg: No edema.   Skin:     General: Skin is warm and dry.      Capillary Refill: Capillary refill takes less than 2 seconds.      Findings: No rash.   Neurological:      General: No focal deficit present.      Mental Status: He is alert and oriented to person, place, and time.   Psychiatric:         Mood and Affect: Mood normal.         Behavior: Behavior normal.         Thought Content: Thought content normal.       Significant Labs:  BMP:   Recent Labs   Lab 03/25/22  0402 03/26/22  0445   GLU 95 124*    139   K 3.9 4.2   CL 96 96   CO2 31* 29   BUN 18 32*   CREATININE 1.3 1.7*   CALCIUM 10.2 9.1   MG  --  1.6   , CMP   Recent Labs   Lab 03/25/22  0402 03/26/22  0445    139   K 3.9 4.2   CL 96 96   CO2 31* 29   GLU 95 124*   BUN 18 32*   CREATININE 1.3 1.7*   CALCIUM 10.2 9.1   PROT 7.5 7.2   ALBUMIN 3.8 3.4*   BILITOT 0.4 0.2   ALKPHOS 108 96   AST 44* 34   ALT 43 32   ANIONGAP 16 14   ESTGFRAFRICA >60 49*   EGFRNONAA 59* 43*   , CBC   Recent Labs   Lab 03/25/22  0402 03/26/22  0445   WBC 5.61 5.94   HGB 13.1* 12.7*   HCT 39.5* 39.0*    256   , INR No results for input(s): INR, PROTIME in the last 48 hours., and Lipid Panel No results for input(s): CHOL, HDL, LDLCALC, TRIG, CHOLHDL in the last 48 hours.    Significant Imaging: Echocardiogram: 2D echo with color flow doppler: No results found for this or any previous visit. and Transthoracic echo (TTE) complete (Cupid Only):   Results for orders placed or performed during the hospital encounter of 03/24/22   Echo   Result Value Ref Range    BSA 1.63 m2    TDI SEPTAL 0.03 m/s    LV LATERAL E/E' RATIO 16.25 m/s    LV SEPTAL E/E' RATIO 21.67 m/s    LA WIDTH 3.17 cm    IVC diameter 1.17 cm    Left Ventricular Outflow Tract Mean Velocity 0.31298478428630 cm/s    Left Ventricular Outflow Tract Mean Gradient 0.93 mmHg    TDI LATERAL 0.04 m/s    LVIDd 4.23 3.5 - 6.0 cm    IVS 1.69 (A) 0.6 - 1.1 cm    Posterior Wall 1.77 (A) 0.6 - 1.1 cm    Ao root annulus 2.85 cm    LVIDs 3.78 2.1 - 4.0 cm    FS 11 28 - 44 %    LA volume 25.30 cm3    Sinus 3.34 cm    STJ 2.85 cm    Ascending aorta 3.31 cm    LV mass 315.99 g    LA size 2.52 cm    TAPSE 1.32 cm    Left Ventricle Relative Wall Thickness 0.84 cm    AV mean gradient 2 mmHg    AV valve area 2.55 cm2    AV Velocity Ratio 0.63     AV index (prosthetic) 0.65     MV valve area p 1/2 method 3.93 cm2    E/A ratio 1.08     Mean e' 0.04 m/s    E  wave deceleration time [id number] msec    LVOT diameter 2.24 cm    LVOT area 3.9 cm2    LVOT peak jason 0.60 m/s    LVOT peak VTI 9.90 cm    Ao peak jason 0.95 m/s    Ao VTI 15.3 cm    RVOT peak jason 0.62 m/s    RVOT peak VTI 9.8 cm    LVOT stroke volume 38.99 cm3    AV peak gradient 4 mmHg    PV mean gradient 0.90 mmHg    E/E' ratio 18.57 m/s    MV Peak E Jason 0.65 m/s    TR Max Jason 2.74 m/s    MV stenosis pressure 1/2 time 55.817545737876326 ms    MV Peak A Jason 0.60 m/s    LV Systolic Volume 61.07 mL    LV Systolic Volume Index 37.7 mL/m2    LV Diastolic Volume 80.12 mL    LV Diastolic Volume Index 49.46 mL/m2    LA Volume Index 15.6 mL/m2    LV Mass Index 195 g/m2    Echo EF Estimated 24 %    RA Major Axis 4.68 cm    Left Atrium Minor Axis 3.17 cm    Left Atrium Major Axis 4.52 cm    Triscuspid Valve Regurgitation Peak Gradient 30 mmHg    RA Width 2.85 cm    Right Atrial Pressure (from IVC) 3 mmHg    EF 30 %    TV rest pulmonary artery pressure 33 mmHg    Narrative    · The left ventricle is normal in size with concentric hypertrophy and   moderately decreased systolic function.  · Grade I left ventricular diastolic dysfunction.  · The estimated PA systolic pressure is 33 mmHg.  · Normal right ventricular size with mildly reduced right ventricular   systolic function.  · Normal central venous pressure (3 mmHg).  · Trivial pericardial effusion.  · The estimated ejection fraction is 30%.  · There is left ventricular global hypokinesis.        Assessment and Plan:       * Hypertensive emergency  Continue Toprol XL, hydralazine, Isordil  Recommending adding ARB for CHF    3/26/22  Mild YANG today  Would not start ARB  Once discharged, will need repeat labs  Continue Toprol XL, hydralazine, Isordil    Type 2 diabetes mellitus without complication, without long-term current use of insulin  Reports history of diabetes  A1c 5.5    Periumbilical abdominal pain, unspecified   Management per primary team    Acute  Decompensated heart failure, new onset   Newly diagnosed CHF, EF 30%, global hypokinesis  Reports being a heavy drinker, now trying to cut back  Continue GDMT for CHF.  Add ARB  Keep NPO at midnight for stress test in the a.m for ischemia evaluation    3/26/22  Stress test with anterior apical scar  Will follow-up in CHF clinic  Discussed ETOH cessation, fluid restriction, daily weights, salt restriction      NSTEMI (non-ST elevated myocardial infarction)  NSTEMI due to demand ischemia in the setting of acute CHF exacerbation  Troponin has trended down  No EKG changes    3/26/22  Stress test with anterior apical scar, no evidence of ischemia  Continue GDMT for CHF        VTE Risk Mitigation (From admission, onward)         Ordered     enoxaparin injection 40 mg  Daily         03/24/22 2258     IP VTE HIGH RISK PATIENT  Once         03/24/22 2258     Place sequential compression device  Until discontinued         03/24/22 2258                Nelida Storey MD  Cardiology  [facility] ([facility])

## 2022-03-27 NOTE — ASSESSMENT & PLAN NOTE
Newly diagnosed CHF, EF 30%, global hypokinesis  Reports being a heavy drinker, now trying to cut back  Continue GDMT for CHF.  Add ARB  Keep NPO at midnight for stress test in the a.m for ischemia evaluation    3/26/22  Stress test with anterior apical scar  Will follow-up in CHF clinic  Discussed ETOH cessation, fluid restriction, daily weights, salt restriction

## 2022-03-27 NOTE — HOSPITAL COURSE
Mr. Garrison is a 61-year-old male, noncompliance, diabetes, ETOH abuse who comes in to the ED with shortness of breath, abdominal pain.  Patient reports history of diverticulosis.  He stopped taking his medications 2 months ago.  Reports PND and orthopnea.  Denies lower extremity swelling.  He was initially admitted to the ICU for hypertensive emergency and placed on a Cardene drip.  Has since been transition to the floor.  BNP 2944. Troponin 0.144>0.125 Received IV Lasix for volume overload.  Placed on oral antihypertensives.  Echocardiogram with 30% EF, global hypokinesis.  Denies history of tobacco abuse, drug abuse.  EKG sinus tach, septal infarct  Denies family history of cardiac problems  Reports being a heavy drinker.  But, is now trying to cut back.        3/26/2022  Stress test performed today showed anterior apical scar.  Patient doing well otherwise.  Denies shortness of breath or chest pain.

## 2022-03-27 NOTE — ASSESSMENT & PLAN NOTE
NSTEMI due to demand ischemia in the setting of acute CHF exacerbation  Troponin has trended down  No EKG changes    3/26/22  Stress test with anterior apical scar, no evidence of ischemia  Continue GDMT for CHF

## 2022-03-29 DIAGNOSIS — I16.1 HYPERTENSIVE EMERGENCY: ICD-10-CM

## 2022-03-29 DIAGNOSIS — I50.9 DECOMPENSATED HEART FAILURE: ICD-10-CM

## 2022-03-29 DIAGNOSIS — I21.4 NSTEMI (NON-ST ELEVATED MYOCARDIAL INFARCTION): Primary | ICD-10-CM

## 2022-03-29 NOTE — DISCHARGE SUMMARY
[facility] ([facility])  [facility] Medicine  Discharge Summary      Patient Name: Vipin Person Jr.  MRN: [id number]  Patient Class: IP- Inpatient  Admission Date: 3/24/2022  Hospital Length of Stay: 3 days  Discharge Date and Time: 3/27/2022  1:45 PM  Attending Physician: No att. providers found   Discharging Provider: Astrid Saez MD  Primary Care Provider: Primary Doctor No      HPI:   Vipin Person Jr. is a 61-year-old male  male with a PMHx of HTN who presents to [facility] ED for evaluation of periumbilical abdominal pain which onset 1 week ago. States since he has a history of diverticulosis, he assumed the pain he was experiencing was related to that and simply planned for a PCP follow-up regarding the possible diverticulosis pain and refill of HTN medications on next Monday. However, two days ago he gradually started experiencing shortness of breath that significantly began worsening today and he decided he could not wait until Monday to see the PCP and came to the ED right away. Reports he has been out of his HTN medication for over two months while he had been away in [place]. Patient denies fever, H/A, nausea, vomiting, chest pain, chills, diarrhea, or constipation. CT of Abdomen/Pelvis which showed trace bilateral pleural effusions and probable pulmonary edema. Correlation for volume overload. Few scattered subsolid opacities possibly related to edema but viral pneumonitis not excluded. No evidence of diverticulitis or other acute abdominal abnormality. EKG showed sinus tachycardia. Labs included BNP 2,844, Troponin 0.144. He is a Full Code status and his surrogate decision maker is his mother, Breonna Person at [phone]. He is admitted to Hospital Medicine and placed in ICU for close monitoring.       * No surgery found *      Hospital Course:   Admitted to ICU on Cardene gtt for Hypertensive emergency . BP was 180/130 on admit associated with pulmonary edema , elevated BNP and elevated  troponin. Pt started on ASA, statin, BB and IV lasix.. Cardiology was consulted . Additionally pt had periumbilical abdominal pain on presentation which has resolved after administration of 6 mg Morphine IV in the ED. CT abd /plevis with no intraabdominal acute process.     3/25- Pt is seen at bedside in ICU. Fully awake , alert, Ox3. Off Cardene gtt. Admits to not taking home BP meds for at least 2 mos. Denies chest pain. SOB has improved and denies further abd pain. Denies constipation. Troponin elevation is flat, peaked at 0.144 down to 0.125. Oral antihypertensives include BB, Hydralazine and Imdur initiated . Creatinine 1.3. Metabolic alkalosis is noted. Diuretic transitioned to oral Furosemide. Await Cardiology input. Downgrade to telemetry. Echo in progress.     3/26- AAOx 3. Reports feeling better overall. No further abd pain, SOB or chest pain. Echo resulted LVEF 30%, LV global hypokinesis , G1DD, mildly reduced RV systolic function. Cardiology consult obtained and suggested MPI stress test today for ischemic workup. ARB/ACEi not initiated due to bumped creatinine 1.7>1.4. Lasix held. Clinically appeared compensated. BNP down to 566>2884. BP control is favorable.     3/27- S/P MPI stress test yesterday . Stress test with anterior apical scar, no evidence of ischemia. Cardiology suggested to continue GDMT for CHF and outpatient follow up in the cardiology clinic. Creatinine improved to 1.5. Losartan 25 mg po daily initiated . Hydralazine /Imdur discontinued . Pt will continue ASA, BB, ARB and statin . Pt is examined and deemed suitable for discharge.        Goals of Care Treatment Preferences:  Code Status: Full Code      Consults:   Consults (From admission, onward)        Status Ordering Provider     Inpatient consult to Cardiology  Once        Provider:  Nelida Storey MD    Completed VERENA SALAMANCA          No new Assessment & Plan notes have been filed under this hospital service since the last  note was generated.  Service: Hospital Medicine    Final Active Diagnoses:    Diagnosis Date Noted POA    PRINCIPAL PROBLEM:  Hypertensive emergency [I16.1] 03/24/2022 Yes    Acute Decompensated heart failure, new onset  [I50.9] 03/24/2022 Yes    NSTEMI (non-ST elevated myocardial infarction) [I21.4] 03/24/2022 Yes    Renal insufficiency [N28.9] 03/25/2022 Yes    Periumbilical abdominal pain, unspecified  [R10.33] 03/25/2022 Yes    Type 2 diabetes mellitus without complication, without long-term current use of insulin [E11.9] 03/25/2022 Yes     Chronic    Alcohol abuse, episodic drinking behavior [F10.10] 03/26/2022 Yes    YANG (acute kidney injury) [N17.9] 03/26/2022 Unknown      Problems Resolved During this Admission:       Discharged Condition: stable    Disposition: Home or Self Care    Follow Up:   Follow-up Information     Primary Doctor No. Schedule an appointment as soon as possible for a visit.    Why: Have pt follow up with PCP in 3 days           Nelida Storey MD. Schedule an appointment as soon as possible for a visit in 1 week(s).    Specialty: Cardiology  Why: Cardiology follow up  Contact information:  [address]  [place] [address] [phone]                       Patient Instructions:      Diet Cardiac   Order Comments: Salt restriction 2 gram/day   Fluid 1.5 Liter /day     Activity as tolerated       Significant Diagnostic Studies: Labs:   BMP:   Recent Labs   Lab 03/27/22  0409   GLU 71      K 4.6      CO2 27   BUN 29*   CREATININE 1.5*   CALCIUM 8.9   , CMP   Recent Labs   Lab 03/27/22  0409      K 4.6      CO2 27   GLU 71   BUN 29*   CREATININE 1.5*   CALCIUM 8.9   ANIONGAP 11   ESTGFRAFRICA 57*   EGFRNONAA 50*    and CBC   Recent Labs   Lab 03/27/22 0409   WBC 5.54   HGB 11.5*   HCT 36.2*          Pending Diagnostic Studies:     None         Medications:  Reconciled Home Medications:      Medication List      START taking these  medications    acetaminophen 325 MG tablet  Commonly known as: TYLENOL  Take 2 tablets (650 mg total) by mouth every 4 (four) hours as needed.     aspirin 81 MG EC tablet  Commonly known as: ECOTRIN  Take 1 tablet (81 mg total) by mouth once daily.     atorvastatin 40 MG tablet  Commonly known as: LIPITOR  Take 1 tablet (40 mg total) by mouth every evening. For high cholesterol     furosemide 20 MG tablet  Commonly known as: LASIX  Take 1 tablet (20 mg total) by mouth once daily. For fluid     losartan 25 MG tablet  Commonly known as: COZAAR  Take 1 tablet (25 mg total) by mouth once daily. For high blood pressure     metoprolol succinate 50 MG 24 hr tablet  Commonly known as: TOPROL-XL  Take 1 tablet (50 mg total) by mouth once daily. For heart and high blood pressure            Indwelling Lines/Drains at time of discharge:   Lines/Drains/Airways     None                 Time spent on the discharge of patient: 30  minutes         Astrid Saez MD  Department of Hospital Medicine  [facility] ([facility])

## 2022-03-30 NOTE — PHYSICIAN QUERY
PT Name: Vipin Person Jr.  MR #: [id number]     DOCUMENTATION CLARIFICATION     CDS/: Faith Quiroga               Contact information:[email or web address]  This form is a permanent document in the medical record.     Query Date: March 30, 2022    By submitting this query, we are merely seeking further clarification of documentation.  Please utilize your independent clinical judgment when addressing the question(s) below.    The Medical Record contains the following   Indicators Supporting Clinical Findings Location in Medical Record   x Heart Failure documented Decompensated heart failure Discharge summary   x BNP BNP down to 566>2884 Discharge summary   x EF/Echo ·The left ventricle is normal in size with concentric hypertrophy and moderately decreased systolic function.  ·Grade I left ventricular diastolic dysfunction.  ·The estimated PA systolic pressure is 33 mmHg.  ·Normal right ventricular size with mildly reduced right ventricular systolic function.  ·Normal central venous pressure (3 mmHg).  ·Trivial pericardial effusion.  ·The estimated ejection fraction is 30%.  ·There is left ventricular global hypokinesis.     Echo 3-25    Radiology findings      Subjective/Objective Respiratory Conditions      Recent/Current MI      Heart Transplant, LVAD     x Edema, JVD Pulmonary edema EICU note 3-24    Ascites     x Diuretics/Meds IV Lasix Mar 3-24 to 3-25    Other Treatment      Other       Heart failure is a clinical diagnosis which includes symptomatic fluid retention, elevated intracardiac pressures, and/or the inability of the heart to deliver adequate blood flow.     Heart Failure with reduced Ejection Fraction (HFrEF) or Systolic Heart Failure (loses ability to contract normally, EF is <40%)     Heart Failure with preserved Ejection Fraction (HFpEF) or Diastolic Heart Failure (stiff ventricles, does not relax properly, EF is >50%)      Heart Failure with Combined Systolic and Diastolic  Failure (stiff ventricles, does not relax properly and EF is <50%)     Mid-range or mildly reduced ejection fraction (HFmrEF) is classified as systolic heart failure.   Common clues to acute exacerbation:  Rapidly progressive symptoms (w/in 2 weeks of presentation), using IV diuretics, using supplemental O2, pulmonary edema on Xray, new or worsening pleural effusion, +JVD or other signs of volume overload, MI w/in 4 weeks, and/or BNP >500  The clinical guidelines noted are only system guidelines, and do not replace the providers clinical judgment.    Provider, please specify the type of heart failure  associated with the above clinical findings.    [   ]  Acute on Chronic Combined Systolic and Diastolic Heart Failure - worsening of CHF signs/symptoms in preexisting CHF   [   ]  Other (please specify): ___________________________________   [ x ]  Clinically Undetermined     Please document in your progress notes daily for the duration of treatment until resolved and include in your discharge summary.    References:  American Heart Association editorial staff. (2017, May). Ejection Fraction Heart Failure Measurement. American Heart Association. https://www.heart.org/en/health-topics/heart-failure/diagnosing-heart-failure/ejection-fraction-heart-failure-measurement#:~:text=Ejection%20fraction%20(EF)%20is%20a,pushed%20out%20with%20each%20heartbeat  TONY Matamoros (2020, December 15). Heart failure with preserved ejection fraction: Clinical manifestations and diagnosis. [employer]. https://www.[employer].com/contents/heart-failure-with-preserved-ejection-fraction-clinical-manifestations-and-diagnosis.  ICD-10-CM/PCS Coding Clinic Third Quarter ICD-10, Effective with discharges: September 8, 2020 [facility] § Heart failure with mid-range or mildly reduced ejection fraction (2020).  Form No. 31512

## 2022-04-01 NOTE — PHYSICIAN QUERY
PT Name: Vipin Person Jr.  MR #: [id number]     DOCUMENTATION CLARIFICATION     CDS/: Faith Quiroga               Contact information:[email or web address]  This form is a permanent document in the medical record.     Query Date: April 1, 2022    By submitting this query, we are merely seeking further clarification of documentation.  Please utilize your independent clinical judgment when addressing the question(s) below.    The Medical Record contains the following   Indicators Supporting Clinical Findings Location in Medical Record   x Heart Failure documented Decompensated heart failure Discharge summary   x BNP BNP down to 566>2884 Discharge summary   x EF/Echo ·The left ventricle is normal in size with concentric hypertrophy and moderately decreased systolic function.  ·Grade I left ventricular diastolic dysfunction.  ·The estimated PA systolic pressure is 33 mmHg.  ·Normal right ventricular size with mildly reduced right ventricular systolic function.  ·Normal central venous pressure (3 mmHg).  ·Trivial pericardial effusion.  ·The estimated ejection fraction is 30%.  ·There is left ventricular global hypokinesis.     Echo 3-25    Radiology findings      Subjective/Objective Respiratory Conditions      Recent/Current MI      Heart Transplant, LVAD     x Edema, JVD Pulmonary edema EICU note 3-24    Ascites     x Diuretics/Meds IV Lasix Mar 3-24 to 3-25    Other Treatment      Other       Heart failure is a clinical diagnosis which includes symptomatic fluid retention, elevated intracardiac pressures, and/or the inability of the heart to deliver adequate blood flow.    Heart Failure with reduced Ejection Fraction (HFrEF) or Systolic Heart Failure (loses ability to contract normally, EF is <40%)    Heart Failure with preserved Ejection Fraction (HFpEF) or Diastolic Heart Failure (stiff ventricles, does not relax properly, EF is >50%)     Heart Failure with Combined Systolic and Diastolic  Failure (stiff ventricles, does not relax properly and EF is <50%)    Mid-range or mildly reduced ejection fraction (HFmrEF) is classified as systolic heart failure.   Common clues to acute exacerbation:  Rapidly progressive symptoms (w/in 2 weeks of presentation), using IV diuretics, using supplemental O2, pulmonary edema on Xray, new or worsening pleural effusion, +JVD or other signs of volume overload, MI w/in 4 weeks, and/or BNP >500  The clinical guidelines noted are only system guidelines, and do not replace the providers clinical judgment.    Provider, please specify the type of heart failure  associated with the above clinical findings.    [  x ]  Acute on Chronic Combined Systolic and Diastolic Heart Failure - worsening of CHF signs/symptoms in preexisting CHF   [   ]  Other (please specify): ___________________________________   [  ]  Clinically Undetermined     Please document in your progress notes daily for the duration of treatment until resolved and include in your discharge summary.    References:  American Heart Association editorial staff. (2017, May). Ejection Fraction Heart Failure Measurement. American Heart Association. https://www.heart.org/en/health-topics/heart-failure/diagnosing-heart-failure/ejection-fraction-heart-failure-measurement#:~:text=Ejection%20fraction%20(EF)%20is%20a,pushed%20out%20with%20each%20heartbeat  TONY Matamoros (2020, December 15). Heart failure with preserved ejection fraction: Clinical manifestations and diagnosis. [employer]. https://www.[employer].com/contents/heart-failure-with-preserved-ejection-fraction-clinical-manifestations-and-diagnosis.  ICD-10-CM/PCS Coding Clinic Third Quarter ICD-10, Effective with discharges: September 8, 2020 [facility] § Heart failure with mid-range or mildly reduced ejection fraction (2020).  Form No. 62316

## 2022-04-19 ENCOUNTER — OFFICE VISIT (OUTPATIENT)
Dept: TRANSPLANT | Facility: CLINIC | Age: 62
End: 2022-04-19
Payer: COMMERCIAL

## 2022-04-19 ENCOUNTER — LAB VISIT (OUTPATIENT)
Dept: LAB | Facility: HOSPITAL | Age: 62
End: 2022-04-19
Attending: STUDENT IN AN ORGANIZED HEALTH CARE EDUCATION/TRAINING PROGRAM
Payer: COMMERCIAL

## 2022-04-19 VITALS
HEART RATE: 74 BPM | SYSTOLIC BLOOD PRESSURE: 130 MMHG | OXYGEN SATURATION: 99 % | BODY MASS INDEX: 23.92 KG/M2 | WEIGHT: 139.31 LBS | DIASTOLIC BLOOD PRESSURE: 84 MMHG

## 2022-04-19 DIAGNOSIS — I50.9 DECOMPENSATED HEART FAILURE: ICD-10-CM

## 2022-04-19 DIAGNOSIS — I16.1 HYPERTENSIVE EMERGENCY: Primary | ICD-10-CM

## 2022-04-19 DIAGNOSIS — I21.4 NSTEMI (NON-ST ELEVATED MYOCARDIAL INFARCTION): ICD-10-CM

## 2022-04-19 DIAGNOSIS — I16.1 HYPERTENSIVE EMERGENCY: ICD-10-CM

## 2022-04-19 PROCEDURE — 3079F DIAST BP 80-89 MM HG: CPT | Mod: CPTII,S$GLB,, | Performed by: INTERNAL MEDICINE

## 2022-04-19 PROCEDURE — 99214 OFFICE O/P EST MOD 30 MIN: CPT | Mod: S$GLB,,, | Performed by: INTERNAL MEDICINE

## 2022-04-19 PROCEDURE — 3044F PR MOST RECENT HEMOGLOBIN A1C LEVEL <7.0%: ICD-10-PCS | Mod: CPTII,S$GLB,, | Performed by: INTERNAL MEDICINE

## 2022-04-19 PROCEDURE — 3075F SYST BP GE 130 - 139MM HG: CPT | Mod: CPTII,S$GLB,, | Performed by: INTERNAL MEDICINE

## 2022-04-19 PROCEDURE — 4010F PR ACE/ARB THEARPY RXD/TAKEN: ICD-10-PCS | Mod: CPTII,S$GLB,, | Performed by: INTERNAL MEDICINE

## 2022-04-19 PROCEDURE — 3079F PR MOST RECENT DIASTOLIC BLOOD PRESSURE 80-89 MM HG: ICD-10-PCS | Mod: CPTII,S$GLB,, | Performed by: INTERNAL MEDICINE

## 2022-04-19 PROCEDURE — 1111F PR DISCHARGE MEDS RECONCILED W/ CURRENT OUTPATIENT MED LIST: ICD-10-PCS | Mod: CPTII,S$GLB,, | Performed by: INTERNAL MEDICINE

## 2022-04-19 PROCEDURE — 99999 PR PBB SHADOW E&M-EST. PATIENT-LVL III: CPT | Mod: PBBFAC,,, | Performed by: INTERNAL MEDICINE

## 2022-04-19 PROCEDURE — 99999 PR PBB SHADOW E&M-EST. PATIENT-LVL III: ICD-10-PCS | Mod: PBBFAC,,, | Performed by: INTERNAL MEDICINE

## 2022-04-19 PROCEDURE — 1159F PR MEDICATION LIST DOCUMENTED IN MEDICAL RECORD: ICD-10-PCS | Mod: CPTII,S$GLB,, | Performed by: INTERNAL MEDICINE

## 2022-04-19 PROCEDURE — 3008F PR BODY MASS INDEX (BMI) DOCUMENTED: ICD-10-PCS | Mod: CPTII,S$GLB,, | Performed by: INTERNAL MEDICINE

## 2022-04-19 PROCEDURE — 3008F BODY MASS INDEX DOCD: CPT | Mod: CPTII,S$GLB,, | Performed by: INTERNAL MEDICINE

## 2022-04-19 PROCEDURE — 1160F PR REVIEW ALL MEDS BY PRESCRIBER/CLIN PHARMACIST DOCUMENTED: ICD-10-PCS | Mod: CPTII,S$GLB,, | Performed by: INTERNAL MEDICINE

## 2022-04-19 PROCEDURE — 4010F ACE/ARB THERAPY RXD/TAKEN: CPT | Mod: CPTII,S$GLB,, | Performed by: INTERNAL MEDICINE

## 2022-04-19 PROCEDURE — 1159F MED LIST DOCD IN RCRD: CPT | Mod: CPTII,S$GLB,, | Performed by: INTERNAL MEDICINE

## 2022-04-19 PROCEDURE — 3075F PR MOST RECENT SYSTOLIC BLOOD PRESS GE 130-139MM HG: ICD-10-PCS | Mod: CPTII,S$GLB,, | Performed by: INTERNAL MEDICINE

## 2022-04-19 PROCEDURE — 1111F DSCHRG MED/CURRENT MED MERGE: CPT | Mod: CPTII,S$GLB,, | Performed by: INTERNAL MEDICINE

## 2022-04-19 PROCEDURE — 99214 PR OFFICE/OUTPT VISIT, EST, LEVL IV, 30-39 MIN: ICD-10-PCS | Mod: S$GLB,,, | Performed by: INTERNAL MEDICINE

## 2022-04-19 PROCEDURE — 3044F HG A1C LEVEL LT 7.0%: CPT | Mod: CPTII,S$GLB,, | Performed by: INTERNAL MEDICINE

## 2022-04-19 PROCEDURE — 1160F RVW MEDS BY RX/DR IN RCRD: CPT | Mod: CPTII,S$GLB,, | Performed by: INTERNAL MEDICINE

## 2022-04-19 RX ORDER — SACUBITRIL AND VALSARTAN 24; 26 MG/1; MG/1
1 TABLET, FILM COATED ORAL 2 TIMES DAILY
Qty: 60 TABLET | Refills: 6 | Status: SHIPPED | OUTPATIENT
Start: 2022-04-19

## 2022-04-19 NOTE — PROGRESS NOTES
Subjective:   Patient ID:  Vipin Person Jr. is a 61 y.o. male who presents for follow-up of No chief complaint on file.  Vipin Person Jr. is a 61-year-old male  male with a PMHx of HTN who presents to [facility] ED for evaluation of periumbilical abdominal pain which onset 1 week ago. States since he has a history of diverticulosis, he assumed the pain he was experiencing was related to that and simply planned for a PCP follow-up regarding the possible diverticulosis pain and refill of HTN medications on next Monday. However, two days ago he gradually started experiencing shortness of breath that significantly began worsening today and he decided he could not wait until Monday to see the PCP and came to the ED right away. Reports he has been out of his HTN medication for over two months while he had been away in [place]. Patient denies fever, H/A, nausea, vomiting, chest pain, chills, diarrhea, or constipation. CT of Abdomen/Pelvis which showed trace bilateral pleural effusions and probable pulmonary edema. Correlation for volume overload. Few scattered subsolid opacities possibly related to edema but viral pneumonitis not excluded. No evidence of diverticulitis or other acute abdominal abnormality. EKG showed sinus tachycardia. Labs included BNP 2,844, Troponin 0.144. He is a Full Code status and his surrogate decision maker is his mother, Breonna Person at [phone]. He is admitted to Hospital Medicine and placed in ICU for close monitoring.        AAOx 3. Reports feeling better overall. No further abd pain, SOB or chest pain. Echo resulted LVEF 30%, LV global hypokinesis , G1DD, mildly reduced RV systolic function. Cardiology consult obtained and suggested MPI stress test today for ischemic workup. ARB/ACEi not initiated due to bumped creatinine 1.7>1.4. Lasix held. Clinically appeared compensated. BNP down to 566>2884. BP control is favorable.      3/27- S/P MPI stress test yesterday . Stress test with  anterior apical scar, no evidence of ischemia. Cardiology suggested to continue GDMT for CHF and outpatient follow up in the cardiology clinic. Creatinine improved to 1.5. Losartan 25 mg po daily initiated . Hydralazine /Imdur discontinued . Pt will continue ASA, BB, ARB and statin   This visit finds patient feeling well.  Doing well current medications and I have changes medications from losartan to Entresto.  Patient will continue his medications well as metoprolol atorvastatin furosemide aspirin.  Follow-up evaluation 1 month.  Repeat lab tests today.  Patient otherwise doing well LVEF 30% by prior echo.  Will repeat echo in the next 1-2 months.      Review of Systems   Constitutional: Negative for chills, diaphoresis, night sweats, weight gain and weight loss.   HENT: Negative for congestion, hoarse voice, sore throat and stridor.    Eyes: Negative for double vision and pain.   Cardiovascular: Negative for chest pain, claudication, cyanosis, dyspnea on exertion, irregular heartbeat, leg swelling, near-syncope, orthopnea, palpitations, paroxysmal nocturnal dyspnea and syncope.   Respiratory: Negative for cough, hemoptysis, shortness of breath, sleep disturbances due to breathing, snoring, sputum production and wheezing.    Endocrine: Negative for cold intolerance, heat intolerance and polydipsia.   Hematologic/Lymphatic: Negative for bleeding problem. Does not bruise/bleed easily.   Skin: Negative for color change, dry skin and rash.   Musculoskeletal: Negative for joint swelling and muscle cramps.   Gastrointestinal: Negative for bloating, abdominal pain, constipation, diarrhea, dysphagia, melena, nausea and vomiting.   Genitourinary: Negative for flank pain and urgency.   Neurological: Negative for dizziness, focal weakness, headaches, light-headedness, loss of balance, seizures and weakness.   Psychiatric/Behavioral: Negative for altered mental status and memory loss. The patient is not nervous/anxious.       No family history on file.  No past medical history on file.  Social History     Socioeconomic History    Marital status: Single     Current Outpatient Medications on File Prior to Visit   Medication Sig Dispense Refill    acetaminophen (TYLENOL) 325 MG tablet Take 2 tablets (650 mg total) by mouth every 4 (four) hours as needed.  0    aspirin (ECOTRIN) 81 MG EC tablet Take 1 tablet (81 mg total) by mouth once daily. 30 tablet 0    atorvastatin (LIPITOR) 40 MG tablet Take 1 tablet (40 mg total) by mouth every evening. For high cholesterol 30 tablet 0    furosemide (LASIX) 20 MG tablet Take 1 tablet (20 mg total) by mouth once daily. For fluid 30 tablet 0    losartan (COZAAR) 25 MG tablet Take 1 tablet (25 mg total) by mouth once daily. For high blood pressure 30 tablet 0    metoprolol succinate (TOPROL-XL) 50 MG 24 hr tablet Take 1 tablet (50 mg total) by mouth once daily. For heart and high blood pressure 30 tablet 0     No current facility-administered medications on file prior to visit.     Review of patient's allergies indicates:  No Known Allergies    Objective:     Physical Exam  Eyes:      Pupils: Pupils are equal, round, and reactive to light.   Neck:      Trachea: No tracheal deviation.   Cardiovascular:      Rate and Rhythm: Normal rate and regular rhythm.      Pulses: Intact distal pulses.           Carotid pulses are 2+ on the right side and 2+ on the left side.       Radial pulses are 2+ on the right side and 2+ on the left side.        Femoral pulses are 2+ on the right side and 2+ on the left side.       Popliteal pulses are 2+ on the right side and 2+ on the left side.        Dorsalis pedis pulses are 2+ on the right side and 2+ on the left side.        Posterior tibial pulses are 2+ on the right side and 2+ on the left side.      Heart sounds: Normal heart sounds. No murmur heard.    No friction rub. No gallop.   Pulmonary:      Effort: Pulmonary effort is normal. No respiratory distress.       Breath sounds: Normal breath sounds. No stridor. No wheezing or rales.   Chest:      Chest wall: No tenderness.   Abdominal:      General: There is no distension.      Tenderness: There is no abdominal tenderness. There is no rebound.   Musculoskeletal:         General: No tenderness.      Cervical back: Normal range of motion.   Skin:     General: Skin is warm and dry.   Neurological:      Mental Status: He is alert and oriented to person, place, and time.     Cardiac echo 03/25/2022  · The left ventricle is normal in size with concentric hypertrophy and moderately decreased systolic function.  · Grade I left ventricular diastolic dysfunction.  · The estimated PA systolic pressure is 33 mmHg.  · Normal right ventricular size with mildly reduced right ventricular systolic function.  · Normal central venous pressure (3 mmHg).  · Trivial pericardial effusion.  · The estimated ejection fraction is 30%.        Assessment:     1. Hypertensive emergency    2. NSTEMI (non-ST elevated myocardial infarction)    3. Acute Decompensated heart failure, new onset         Plan:     Hypertensive emergency    NSTEMI (non-ST elevated myocardial infarction)    Acute Decompensated heart failure, new onset     Impression 1 hypertension urgency stable current medications   2. Decompensated heart failure improved and will switch to Entresto as LVEF is about in the heart by cardiac nuclear test.  Otherwise doing well.  Follow-up evaluation again within 1 month.  Entresto started the next 24-48 hours.  All questions answered follow-up evaluation again within the next month.  Lab test pending today

## 2022-04-20 ENCOUNTER — TELEPHONE (OUTPATIENT)
Dept: CARDIOLOGY | Facility: CLINIC | Age: 62
End: 2022-04-20
Payer: COMMERCIAL

## 2022-04-20 DIAGNOSIS — E87.5 HYPERKALEMIA: Primary | ICD-10-CM

## 2022-04-20 DIAGNOSIS — I50.9 ACUTE CONGESTIVE HEART FAILURE, UNSPECIFIED HEART FAILURE TYPE: ICD-10-CM

## 2022-04-20 DIAGNOSIS — I16.1 HYPERTENSIVE EMERGENCY: ICD-10-CM

## 2022-04-20 NOTE — TELEPHONE ENCOUNTER
Spoke with pts wife regarding test results, informed her pt should refrain from any extra potassium rich foods and repeat labs were scheduled for next week. Pt wife verbalized understanding.

## 2022-04-20 NOTE — TELEPHONE ENCOUNTER
----- Message from Hua Peters MD sent at 4/20/2022  8:05 AM CDT -----  Potassium level is elevated, please refrain from any extra potassium such as bananas, hydrate and repeat lab test next week I ordered

## 2024-12-19 NOTE — CONSULTS
[facility] - Tele[facility] ([facility])  Cardiology  Consult Note    Patient Name: Vipin Person Jr.  MRN: [id number]  Admission Date: 3/24/2022  Hospital Length of Stay: 1 days  Code Status: Full Code   Attending Provider: Astrid Saez MD   Consulting Provider: Nelida Storey MD  Primary Care Physician: Primary Doctor No  Principal Problem:Hypertensive emergency    Patient information was obtained from patient and ER records.     Inpatient consult to Cardiology  Consult performed by: Nelida Storey MD  Consult ordered by: Astrid Saez MD  Reason for consult: new onset CHF        Subjective:     Chief Complaint:  Shortness of breath     HPI:   Mr. Garrison is a 61-year-old male, noncompliance, diabetes, ETOH abuse who comes in to the ED with shortness of breath, abdominal pain.  Patient reports history of diverticulosis.  He stopped taking his medications 2 months ago.  Reports PND and orthopnea.  Denies lower extremity swelling.  He was initially admitted to the ICU for hypertensive emergency and placed on a Cardene drip.  Has since been transition to the floor.  BNP 2944. Troponin 0.144>0.125 Received IV Lasix for volume overload.  Placed on oral antihypertensives.  Echocardiogram with 30% EF, global hypokinesis.  Denies history of tobacco abuse, drug abuse.  EKG sinus tach, septal infarct  Denies family history of cardiac problems  Reports being a heavy drinker.  But, is now trying to cut back.            No past medical history on file.    No past surgical history on file.    Review of patient's allergies indicates:  No Known Allergies    No current facility-administered medications on file prior to encounter.     No current outpatient medications on file prior to encounter.     Family History    None       Tobacco Use    Smoking status: Not on file    Smokeless tobacco: Not on file   Substance and Sexual Activity    Alcohol use: Not on file    Drug use: Not on file    Sexual activity: Not on file     Review  Please call Su Solorzano in 2 weeks to evaluate pain response to  Interlaminar Epidural Steroid Injection, Lumbar, L4-L5  performed by  Gustavo Lo MD, D. LIZ on 12/19/2024.    Thank you.     Pain prior to procedure: 3    No upcoming scheduled office visit.    of Systems   Constitutional: Negative for diaphoresis, malaise/fatigue, weight gain and weight loss.   HENT:  Negative for congestion and nosebleeds.    Cardiovascular:  Positive for orthopnea and paroxysmal nocturnal dyspnea. Negative for chest pain, claudication, cyanosis, dyspnea on exertion, irregular heartbeat, leg swelling, near-syncope, palpitations and syncope.   Respiratory:  Positive for shortness of breath. Negative for cough, hemoptysis, sleep disturbances due to breathing, snoring, sputum production and wheezing.    Hematologic/Lymphatic: Negative for bleeding problem. Does not bruise/bleed easily.   Skin:  Negative for rash.   Musculoskeletal:  Negative for arthritis, back pain, falls, joint pain, muscle cramps and muscle weakness.   Gastrointestinal:  Negative for abdominal pain, constipation, diarrhea, heartburn, hematemesis, hematochezia, melena, nausea and vomiting.   Genitourinary:  Negative for dysuria, hematuria and nocturia.   Neurological:  Negative for excessive daytime sleepiness, dizziness, headaches, light-headedness, loss of balance, numbness, vertigo and weakness.   Objective:     Vital Signs (Most Recent):  Temp: 98.2 °F (36.8 °C) (Simultaneous filing. User may not have seen previous data.) (03/25/22 2001)  Pulse: 85 (03/25/22 2142)  Resp: 18 (03/25/22 2001)  BP: (!) 145/100 (03/25/22 2142)  SpO2: 96 % (03/25/22 2001)   Vital Signs (24h Range):  Temp:  [97.4 °F (36.3 °C)-98.2 °F (36.8 °C)] 98.2 °F (36.8 °C)  Pulse:  [] 85  Resp:  [18-43] 18  SpO2:  [87 %-100 %] 96 %  BP: (111-145)/() 145/100     Weight: 58.5 kg (129 lb)  Body mass index is 22.14 kg/m².    SpO2: 96 %  O2 Device (Oxygen Therapy): room air      Intake/Output Summary (Last 24 hours) at 3/25/2022 2203  Last data filed at 3/25/2022 1800  Gross per 24 hour   Intake 182.09 ml   Output 2825 ml   Net -2642.91 ml       Lines/Drains/Airways       Peripheral Intravenous Line  Duration                  Peripheral IV -  Single Lumen 03/24/22 1715 20 G Right Antecubital 1 day                    Physical Exam  Vitals and nursing note reviewed.   Constitutional:       Appearance: Normal appearance. He is well-developed.   HENT:      Head: Normocephalic.      Mouth/Throat:      Mouth: Mucous membranes are moist.   Neck:      Vascular: No carotid bruit or JVD.   Cardiovascular:      Rate and Rhythm: Normal rate and regular rhythm.      Pulses: Normal pulses.      Heart sounds: Normal heart sounds. No murmur heard.    No friction rub.   Pulmonary:      Effort: Pulmonary effort is normal. No respiratory distress.      Breath sounds: Normal breath sounds. No wheezing or rales.   Abdominal:      General: Bowel sounds are normal. There is no distension.      Palpations: Abdomen is soft.      Tenderness: There is no abdominal tenderness. There is no guarding.   Musculoskeletal:         General: No swelling or tenderness.      Cervical back: Neck supple. No tenderness.      Right lower leg: No edema.      Left lower leg: No edema.   Skin:     General: Skin is warm and dry.      Capillary Refill: Capillary refill takes less than 2 seconds.      Findings: No rash.   Neurological:      General: No focal deficit present.      Mental Status: He is alert and oriented to person, place, and time.   Psychiatric:         Mood and Affect: Mood normal.         Behavior: Behavior normal.         Thought Content: Thought content normal.       Significant Labs: BMP:   Recent Labs   Lab 03/24/22  1715 03/25/22  0402   GLU 79 95    143   K 4.7 3.9    96   CO2 25 31*   BUN 19 18   CREATININE 1.4 1.3   CALCIUM 9.7 10.2   , CMP   Recent Labs   Lab 03/24/22  1715 03/25/22  0402    143   K 4.7 3.9    96   CO2 25 31*   GLU 79 95   BUN 19 18   CREATININE 1.4 1.3   CALCIUM 9.7 10.2   PROT 8.0 7.5   ALBUMIN 3.9 3.8   BILITOT 0.4 0.4   ALKPHOS 107 108   AST 65* 44*   ALT 48* 43   ANIONGAP 16 16   ESTGFRAFRICA >60 >60   EGFRNONAA 54* 59*   , INR  No results for input(s): INR, PROTIME in the last 48 hours., Lipid Panel   Recent Labs   Lab 03/24/22  2309   CHOL 232*   HDL 77*   LDLCALC 142.4   TRIG 63   CHOLHDL 33.2   , and Troponin   Recent Labs   Lab 03/24/22  1715 03/24/22  2309 03/25/22  0402   TROPONINI 0.144* 0.143* 0.125*       Significant Imaging: Echocardiogram: 2D echo with color flow doppler: No results found for this or any previous visit. and Transthoracic echo (TTE) complete (Cupid Only):   Results for orders placed or performed during the hospital encounter of 03/24/22   Echo   Result Value Ref Range    BSA 1.63 m2    TDI SEPTAL 0.03 m/s    LV LATERAL E/E' RATIO 16.25 m/s    LV SEPTAL E/E' RATIO 21.67 m/s    LA WIDTH 3.17 cm    IVC diameter 1.17 cm    Left Ventricular Outflow Tract Mean Velocity 0.14550620665382 cm/s    Left Ventricular Outflow Tract Mean Gradient 0.93 mmHg    TDI LATERAL 0.04 m/s    LVIDd 4.23 3.5 - 6.0 cm    IVS 1.69 (A) 0.6 - 1.1 cm    Posterior Wall 1.77 (A) 0.6 - 1.1 cm    Ao root annulus 2.85 cm    LVIDs 3.78 2.1 - 4.0 cm    FS 11 28 - 44 %    LA volume 25.30 cm3    Sinus 3.34 cm    STJ 2.85 cm    Ascending aorta 3.31 cm    LV mass 315.99 g    LA size 2.52 cm    TAPSE 1.32 cm    Left Ventricle Relative Wall Thickness 0.84 cm    AV mean gradient 2 mmHg    AV valve area 2.55 cm2    AV Velocity Ratio 0.63     AV index (prosthetic) 0.65     MV valve area p 1/2 method 3.93 cm2    E/A ratio 1.08     Mean e' 0.04 m/s    E wave deceleration time [id number] msec    LVOT diameter 2.24 cm    LVOT area 3.9 cm2    LVOT peak jason 0.60 m/s    LVOT peak VTI 9.90 cm    Ao peak jason 0.95 m/s    Ao VTI 15.3 cm    RVOT peak jason 0.62 m/s    RVOT peak VTI 9.8 cm    LVOT stroke volume 38.99 cm3    AV peak gradient 4 mmHg    PV mean gradient 0.90 mmHg    E/E' ratio 18.57 m/s    MV Peak E Jason 0.65 m/s    TR Max Jason 2.74 m/s    MV stenosis pressure 1/2 time [id number] ms    MV Peak A Jason 0.60 m/s    LV Systolic Volume 61.07 mL    LV  Systolic Volume Index 37.7 mL/m2    LV Diastolic Volume 80.12 mL    LV Diastolic Volume Index 49.46 mL/m2    LA Volume Index 15.6 mL/m2    LV Mass Index 195 g/m2    Echo EF Estimated 24 %    RA Major Axis 4.68 cm    Left Atrium Minor Axis 3.17 cm    Left Atrium Major Axis 4.52 cm    Triscuspid Valve Regurgitation Peak Gradient 30 mmHg    RA Width 2.85 cm    Right Atrial Pressure (from IVC) 3 mmHg    EF 30 %    TV rest pulmonary artery pressure 33 mmHg    Narrative    · The left ventricle is normal in size with concentric hypertrophy and   moderately decreased systolic function.  · Grade I left ventricular diastolic dysfunction.  · The estimated PA systolic pressure is 33 mmHg.  · Normal right ventricular size with mildly reduced right ventricular   systolic function.  · Normal central venous pressure (3 mmHg).  · Trivial pericardial effusion.  · The estimated ejection fraction is 30%.  · There is left ventricular global hypokinesis.        Assessment and Plan:     * Hypertensive emergency  Continue Toprol XL, hydralazine, Isordil  Recommending adding ARB for CHF    Type 2 diabetes mellitus without complication, without long-term current use of insulin  Reports history of diabetes  Obtain A1c level    Periumbilical abdominal pain, unspecified   Management per primary team    Acute Decompensated heart failure, new onset   Newly diagnosed CHF, EF 30%, global hypokinesis  Reports being a heavy drinker, now trying to cut back  Continue GDMT for CHF.  Add ARB  Keep NPO at midnight for stress test in the a.m for ischemia evaluation      NSTEMI (non-ST elevated myocardial infarction)  NSTEMI due to demand ischemia in the setting of acute CHF exacerbation  Troponin has trended down  No EKG changes        VTE Risk Mitigation (From admission, onward)         Ordered     enoxaparin injection 40 mg  Daily         03/24/22 2258     IP VTE HIGH RISK PATIENT  Once         03/24/22 2258     Place sequential compression device  Until  discontinued         03/24/22 9504                Thank you for your consult. I will follow-up with patient. Please contact us if you have any additional questions.    Nelida Storey MD  Cardiology   [facility] - Telemetry ([facility])